# Patient Record
Sex: FEMALE | Race: WHITE | NOT HISPANIC OR LATINO | URBAN - METROPOLITAN AREA
[De-identification: names, ages, dates, MRNs, and addresses within clinical notes are randomized per-mention and may not be internally consistent; named-entity substitution may affect disease eponyms.]

---

## 2019-01-01 ENCOUNTER — INPATIENT (INPATIENT)
Facility: HOSPITAL | Age: 59
LOS: 4 days | DRG: 917 | End: 2020-01-01
Attending: STUDENT IN AN ORGANIZED HEALTH CARE EDUCATION/TRAINING PROGRAM | Admitting: STUDENT IN AN ORGANIZED HEALTH CARE EDUCATION/TRAINING PROGRAM
Payer: COMMERCIAL

## 2019-01-01 VITALS
OXYGEN SATURATION: 100 % | SYSTOLIC BLOOD PRESSURE: 87 MMHG | RESPIRATION RATE: 16 BRPM | DIASTOLIC BLOOD PRESSURE: 51 MMHG | HEART RATE: 64 BPM | HEIGHT: 68 IN | WEIGHT: 130.07 LBS

## 2019-01-01 DIAGNOSIS — G93.1 ANOXIC BRAIN DAMAGE, NOT ELSEWHERE CLASSIFIED: ICD-10-CM

## 2019-01-01 DIAGNOSIS — I46.9 CARDIAC ARREST, CAUSE UNSPECIFIED: ICD-10-CM

## 2019-01-01 DIAGNOSIS — I21.9 ACUTE MYOCARDIAL INFARCTION, UNSPECIFIED: ICD-10-CM

## 2019-01-01 DIAGNOSIS — E87.2 ACIDOSIS: ICD-10-CM

## 2019-01-01 DIAGNOSIS — J96.90 RESPIRATORY FAILURE, UNSPECIFIED, UNSPECIFIED WHETHER WITH HYPOXIA OR HYPERCAPNIA: ICD-10-CM

## 2019-01-01 DIAGNOSIS — F11.20 OPIOID DEPENDENCE, UNCOMPLICATED: ICD-10-CM

## 2019-01-01 LAB
ALBUMIN SERPL ELPH-MCNC: 2.1 G/DL — LOW (ref 3.3–5)
ALBUMIN SERPL ELPH-MCNC: 2.3 G/DL — LOW (ref 3.3–5)
ALBUMIN SERPL ELPH-MCNC: 2.5 G/DL — LOW (ref 3.3–5)
ALBUMIN SERPL ELPH-MCNC: 2.6 G/DL — LOW (ref 3.3–5)
ALBUMIN SERPL ELPH-MCNC: 2.7 G/DL — LOW (ref 3.3–5)
ALP SERPL-CCNC: 131 U/L — HIGH (ref 30–120)
ALP SERPL-CCNC: 133 U/L — HIGH (ref 30–120)
ALP SERPL-CCNC: 153 U/L — HIGH (ref 30–120)
ALP SERPL-CCNC: 155 U/L — HIGH (ref 30–120)
ALP SERPL-CCNC: 159 U/L — HIGH (ref 30–120)
ALP SERPL-CCNC: 159 U/L — HIGH (ref 30–120)
ALP SERPL-CCNC: 172 U/L — HIGH (ref 30–120)
ALP SERPL-CCNC: 175 U/L — HIGH (ref 30–120)
ALP SERPL-CCNC: 201 U/L — HIGH (ref 30–120)
ALT FLD-CCNC: 109 U/L DA — HIGH (ref 10–60)
ALT FLD-CCNC: 161 U/L DA — HIGH (ref 10–60)
ALT FLD-CCNC: 199 U/L DA — HIGH (ref 10–60)
ALT FLD-CCNC: 200 U/L DA — HIGH (ref 10–60)
ALT FLD-CCNC: 218 U/L DA — HIGH (ref 10–60)
ALT FLD-CCNC: 229 U/L DA — HIGH (ref 10–60)
ALT FLD-CCNC: 236 U/L DA — HIGH (ref 10–60)
ALT FLD-CCNC: 245 U/L DA — HIGH (ref 10–60)
ALT FLD-CCNC: 275 U/L DA — HIGH (ref 10–60)
ANION GAP SERPL CALC-SCNC: 10 MMOL/L — SIGNIFICANT CHANGE UP (ref 5–17)
ANION GAP SERPL CALC-SCNC: 13 MMOL/L — SIGNIFICANT CHANGE UP (ref 5–17)
ANION GAP SERPL CALC-SCNC: 13 MMOL/L — SIGNIFICANT CHANGE UP (ref 5–17)
ANION GAP SERPL CALC-SCNC: 22 MMOL/L — HIGH (ref 5–17)
ANION GAP SERPL CALC-SCNC: 6 MMOL/L — SIGNIFICANT CHANGE UP (ref 5–17)
ANION GAP SERPL CALC-SCNC: 7 MMOL/L — SIGNIFICANT CHANGE UP (ref 5–17)
ANION GAP SERPL CALC-SCNC: 8 MMOL/L — SIGNIFICANT CHANGE UP (ref 5–17)
ANION GAP SERPL CALC-SCNC: 8 MMOL/L — SIGNIFICANT CHANGE UP (ref 5–17)
ANION GAP SERPL CALC-SCNC: 9 MMOL/L — SIGNIFICANT CHANGE UP (ref 5–17)
APPEARANCE UR: CLEAR — SIGNIFICANT CHANGE UP
APTT BLD: 34.4 SEC — SIGNIFICANT CHANGE UP (ref 28.5–37)
APTT BLD: 43.1 SEC — HIGH (ref 28.5–37)
APTT BLD: 47.6 SEC — HIGH (ref 28.5–37)
APTT BLD: 48.1 SEC — HIGH (ref 28.5–37)
APTT BLD: 49 SEC — HIGH (ref 28.5–37)
APTT BLD: 49 SEC — HIGH (ref 28.5–37)
APTT BLD: 62.3 SEC — HIGH (ref 28.5–37)
APTT BLD: 65.5 SEC — HIGH (ref 28.5–37)
APTT BLD: 69.8 SEC — HIGH (ref 28.5–37)
APTT BLD: 71.8 SEC — HIGH (ref 28.5–37)
APTT BLD: 82.9 SEC — HIGH (ref 28.5–37)
APTT BLD: 83.4 SEC — HIGH (ref 28.5–37)
APTT BLD: 88.1 SEC — HIGH (ref 28.5–37)
AST SERPL-CCNC: 107 U/L — HIGH (ref 10–40)
AST SERPL-CCNC: 200 U/L — HIGH (ref 10–40)
AST SERPL-CCNC: 313 U/L — HIGH (ref 10–40)
AST SERPL-CCNC: 336 U/L — HIGH (ref 10–40)
AST SERPL-CCNC: 369 U/L — HIGH (ref 10–40)
AST SERPL-CCNC: 424 U/L — HIGH (ref 10–40)
AST SERPL-CCNC: 463 U/L — HIGH (ref 10–40)
AST SERPL-CCNC: 506 U/L — HIGH (ref 10–40)
AST SERPL-CCNC: 783 U/L — HIGH (ref 10–40)
BASE EXCESS BLDA CALC-SCNC: -14 MMOL/L — LOW (ref -2–2)
BASE EXCESS BLDA CALC-SCNC: -2.8 MMOL/L — LOW (ref -2–2)
BASE EXCESS BLDA CALC-SCNC: -3.6 MMOL/L — LOW (ref -2–2)
BASE EXCESS BLDA CALC-SCNC: -4.1 MMOL/L — LOW (ref -2–2)
BASE EXCESS BLDA CALC-SCNC: -5.3 MMOL/L — LOW (ref -2–2)
BASOPHILS # BLD AUTO: 0.01 K/UL — SIGNIFICANT CHANGE UP (ref 0–0.2)
BASOPHILS # BLD AUTO: 0.05 K/UL — SIGNIFICANT CHANGE UP (ref 0–0.2)
BASOPHILS NFR BLD AUTO: 0.1 % — SIGNIFICANT CHANGE UP (ref 0–2)
BASOPHILS NFR BLD AUTO: 0.5 % — SIGNIFICANT CHANGE UP (ref 0–2)
BILIRUB SERPL-MCNC: 0.2 MG/DL — SIGNIFICANT CHANGE UP (ref 0.2–1.2)
BILIRUB SERPL-MCNC: 0.3 MG/DL — SIGNIFICANT CHANGE UP (ref 0.2–1.2)
BILIRUB SERPL-MCNC: 0.4 MG/DL — SIGNIFICANT CHANGE UP (ref 0.2–1.2)
BILIRUB SERPL-MCNC: 0.5 MG/DL — SIGNIFICANT CHANGE UP (ref 0.2–1.2)
BILIRUB SERPL-MCNC: 0.6 MG/DL — SIGNIFICANT CHANGE UP (ref 0.2–1.2)
BILIRUB SERPL-MCNC: 0.8 MG/DL — SIGNIFICANT CHANGE UP (ref 0.2–1.2)
BILIRUB UR-MCNC: NEGATIVE — SIGNIFICANT CHANGE UP
BLOOD GAS SOURCE: SIGNIFICANT CHANGE UP
BUN SERPL-MCNC: 10 MG/DL — SIGNIFICANT CHANGE UP (ref 7–23)
BUN SERPL-MCNC: 12 MG/DL — SIGNIFICANT CHANGE UP (ref 7–23)
BUN SERPL-MCNC: 17 MG/DL — SIGNIFICANT CHANGE UP (ref 7–23)
BUN SERPL-MCNC: 18 MG/DL — SIGNIFICANT CHANGE UP (ref 7–23)
BUN SERPL-MCNC: 18 MG/DL — SIGNIFICANT CHANGE UP (ref 7–23)
BUN SERPL-MCNC: 19 MG/DL — SIGNIFICANT CHANGE UP (ref 7–23)
BUN SERPL-MCNC: 19 MG/DL — SIGNIFICANT CHANGE UP (ref 7–23)
BUN SERPL-MCNC: 20 MG/DL — SIGNIFICANT CHANGE UP (ref 7–23)
BUN SERPL-MCNC: 20 MG/DL — SIGNIFICANT CHANGE UP (ref 7–23)
CALCIUM SERPL-MCNC: 7.4 MG/DL — LOW (ref 8.4–10.5)
CALCIUM SERPL-MCNC: 7.7 MG/DL — LOW (ref 8.4–10.5)
CALCIUM SERPL-MCNC: 7.8 MG/DL — LOW (ref 8.4–10.5)
CALCIUM SERPL-MCNC: 7.9 MG/DL — LOW (ref 8.4–10.5)
CALCIUM SERPL-MCNC: 8.1 MG/DL — LOW (ref 8.4–10.5)
CALCIUM SERPL-MCNC: 8.4 MG/DL — SIGNIFICANT CHANGE UP (ref 8.4–10.5)
CALCIUM SERPL-MCNC: 9.2 MG/DL — SIGNIFICANT CHANGE UP (ref 8.4–10.5)
CHLORIDE SERPL-SCNC: 105 MMOL/L — SIGNIFICANT CHANGE UP (ref 96–108)
CHLORIDE SERPL-SCNC: 106 MMOL/L — SIGNIFICANT CHANGE UP (ref 96–108)
CHLORIDE SERPL-SCNC: 108 MMOL/L — SIGNIFICANT CHANGE UP (ref 96–108)
CHLORIDE SERPL-SCNC: 110 MMOL/L — HIGH (ref 96–108)
CHLORIDE SERPL-SCNC: 111 MMOL/L — HIGH (ref 96–108)
CHLORIDE SERPL-SCNC: 130 MMOL/L — HIGH (ref 96–108)
CO2 SERPL-SCNC: 17 MMOL/L — LOW (ref 22–31)
CO2 SERPL-SCNC: 20 MMOL/L — LOW (ref 22–31)
CO2 SERPL-SCNC: 21 MMOL/L — LOW (ref 22–31)
CO2 SERPL-SCNC: 22 MMOL/L — SIGNIFICANT CHANGE UP (ref 22–31)
CO2 SERPL-SCNC: 23 MMOL/L — SIGNIFICANT CHANGE UP (ref 22–31)
CO2 SERPL-SCNC: 24 MMOL/L — SIGNIFICANT CHANGE UP (ref 22–31)
CO2 SERPL-SCNC: 25 MMOL/L — SIGNIFICANT CHANGE UP (ref 22–31)
CO2 SERPL-SCNC: 25 MMOL/L — SIGNIFICANT CHANGE UP (ref 22–31)
CO2 SERPL-SCNC: 28 MMOL/L — SIGNIFICANT CHANGE UP (ref 22–31)
COLOR SPEC: YELLOW — SIGNIFICANT CHANGE UP
CREAT SERPL-MCNC: 0.93 MG/DL — SIGNIFICANT CHANGE UP (ref 0.5–1.3)
CREAT SERPL-MCNC: 1.06 MG/DL — SIGNIFICANT CHANGE UP (ref 0.5–1.3)
CREAT SERPL-MCNC: 1.07 MG/DL — SIGNIFICANT CHANGE UP (ref 0.5–1.3)
CREAT SERPL-MCNC: 1.09 MG/DL — SIGNIFICANT CHANGE UP (ref 0.5–1.3)
CREAT SERPL-MCNC: 1.11 MG/DL — SIGNIFICANT CHANGE UP (ref 0.5–1.3)
CREAT SERPL-MCNC: 1.13 MG/DL — SIGNIFICANT CHANGE UP (ref 0.5–1.3)
CREAT SERPL-MCNC: 1.15 MG/DL — SIGNIFICANT CHANGE UP (ref 0.5–1.3)
CREAT SERPL-MCNC: 1.15 MG/DL — SIGNIFICANT CHANGE UP (ref 0.5–1.3)
CREAT SERPL-MCNC: 1.42 MG/DL — HIGH (ref 0.5–1.3)
CULTURE RESULTS: NO GROWTH — SIGNIFICANT CHANGE UP
DIFF PNL FLD: ABNORMAL
EOSINOPHIL # BLD AUTO: 0 K/UL — SIGNIFICANT CHANGE UP (ref 0–0.5)
EOSINOPHIL # BLD AUTO: 0.14 K/UL — SIGNIFICANT CHANGE UP (ref 0–0.5)
EOSINOPHIL NFR BLD AUTO: 0 % — SIGNIFICANT CHANGE UP (ref 0–6)
EOSINOPHIL NFR BLD AUTO: 1.3 % — SIGNIFICANT CHANGE UP (ref 0–6)
GLUCOSE SERPL-MCNC: 108 MG/DL — HIGH (ref 70–99)
GLUCOSE SERPL-MCNC: 116 MG/DL — HIGH (ref 70–99)
GLUCOSE SERPL-MCNC: 130 MG/DL — HIGH (ref 70–99)
GLUCOSE SERPL-MCNC: 131 MG/DL — HIGH (ref 70–99)
GLUCOSE SERPL-MCNC: 149 MG/DL — HIGH (ref 70–99)
GLUCOSE SERPL-MCNC: 153 MG/DL — HIGH (ref 70–99)
GLUCOSE SERPL-MCNC: 172 MG/DL — HIGH (ref 70–99)
GLUCOSE SERPL-MCNC: 200 MG/DL — HIGH (ref 70–99)
GLUCOSE SERPL-MCNC: 210 MG/DL — HIGH (ref 70–99)
GLUCOSE UR QL: NEGATIVE MG/DL — SIGNIFICANT CHANGE UP
HBA1C BLD-MCNC: 5.3 % — SIGNIFICANT CHANGE UP (ref 4–5.6)
HCO3 BLDA-SCNC: 13 MMOL/L — LOW (ref 21–29)
HCO3 BLDA-SCNC: 20 MMOL/L — LOW (ref 21–29)
HCO3 BLDA-SCNC: 21 MMOL/L — SIGNIFICANT CHANGE UP (ref 21–29)
HCO3 BLDA-SCNC: 22 MMOL/L — SIGNIFICANT CHANGE UP (ref 21–29)
HCT VFR BLD CALC: 32.7 % — LOW (ref 34.5–45)
HCT VFR BLD CALC: 35.7 % — SIGNIFICANT CHANGE UP (ref 34.5–45)
HCT VFR BLD CALC: 35.7 % — SIGNIFICANT CHANGE UP (ref 34.5–45)
HCT VFR BLD CALC: 36.1 % — SIGNIFICANT CHANGE UP (ref 34.5–45)
HCT VFR BLD CALC: 36.3 % — SIGNIFICANT CHANGE UP (ref 34.5–45)
HCT VFR BLD CALC: 36.3 % — SIGNIFICANT CHANGE UP (ref 34.5–45)
HCT VFR BLD CALC: 36.4 % — SIGNIFICANT CHANGE UP (ref 34.5–45)
HCT VFR BLD CALC: 38.6 % — SIGNIFICANT CHANGE UP (ref 34.5–45)
HCV AB S/CO SERPL IA: 0.07 S/CO — SIGNIFICANT CHANGE UP (ref 0–0.99)
HCV AB SERPL-IMP: SIGNIFICANT CHANGE UP
HGB BLD-MCNC: 10.7 G/DL — LOW (ref 11.5–15.5)
HGB BLD-MCNC: 11.2 G/DL — LOW (ref 11.5–15.5)
HGB BLD-MCNC: 11.7 G/DL — SIGNIFICANT CHANGE UP (ref 11.5–15.5)
HGB BLD-MCNC: 11.8 G/DL — SIGNIFICANT CHANGE UP (ref 11.5–15.5)
HGB BLD-MCNC: 11.8 G/DL — SIGNIFICANT CHANGE UP (ref 11.5–15.5)
HGB BLD-MCNC: 12 G/DL — SIGNIFICANT CHANGE UP (ref 11.5–15.5)
HGB BLD-MCNC: 12.2 G/DL — SIGNIFICANT CHANGE UP (ref 11.5–15.5)
HGB BLD-MCNC: 12.2 G/DL — SIGNIFICANT CHANGE UP (ref 11.5–15.5)
HOROWITZ INDEX BLDA+IHG-RTO: 100 — SIGNIFICANT CHANGE UP
HOROWITZ INDEX BLDA+IHG-RTO: 21 — SIGNIFICANT CHANGE UP
HOROWITZ INDEX BLDA+IHG-RTO: 21 — SIGNIFICANT CHANGE UP
HOROWITZ INDEX BLDA+IHG-RTO: 25 — SIGNIFICANT CHANGE UP
HOROWITZ INDEX BLDA+IHG-RTO: 30 — SIGNIFICANT CHANGE UP
HOROWITZ INDEX BLDA+IHG-RTO: 40 — SIGNIFICANT CHANGE UP
IMM GRANULOCYTES NFR BLD AUTO: 0.6 % — SIGNIFICANT CHANGE UP (ref 0–1.5)
IMM GRANULOCYTES NFR BLD AUTO: 3.3 % — HIGH (ref 0–1.5)
INR BLD: 1.19 RATIO — HIGH (ref 0.88–1.16)
INR BLD: 1.2 RATIO — HIGH (ref 0.88–1.16)
KETONES UR-MCNC: NEGATIVE — SIGNIFICANT CHANGE UP
LACTATE SERPL-SCNC: 1.3 MMOL/L — SIGNIFICANT CHANGE UP (ref 0.7–2)
LACTATE SERPL-SCNC: 1.4 MMOL/L — SIGNIFICANT CHANGE UP (ref 0.7–2)
LACTATE SERPL-SCNC: 1.5 MMOL/L — SIGNIFICANT CHANGE UP (ref 0.7–2)
LACTATE SERPL-SCNC: 13.6 MMOL/L — CRITICAL HIGH (ref 0.7–2)
LACTATE SERPL-SCNC: 2.5 MMOL/L — HIGH (ref 0.7–2)
LACTATE SERPL-SCNC: 3.9 MMOL/L — HIGH (ref 0.7–2)
LEUKOCYTE ESTERASE UR-ACNC: ABNORMAL
LIDOCAIN IGE QN: 293 U/L — SIGNIFICANT CHANGE UP (ref 73–393)
LYMPHOCYTES # BLD AUTO: 0.54 K/UL — LOW (ref 1–3.3)
LYMPHOCYTES # BLD AUTO: 3.77 K/UL — HIGH (ref 1–3.3)
LYMPHOCYTES # BLD AUTO: 35.6 % — SIGNIFICANT CHANGE UP (ref 13–44)
LYMPHOCYTES # BLD AUTO: 4.2 % — LOW (ref 13–44)
MAGNESIUM SERPL-MCNC: 1.6 MG/DL — SIGNIFICANT CHANGE UP (ref 1.6–2.6)
MAGNESIUM SERPL-MCNC: 1.8 MG/DL — SIGNIFICANT CHANGE UP (ref 1.6–2.6)
MAGNESIUM SERPL-MCNC: 1.9 MG/DL — SIGNIFICANT CHANGE UP (ref 1.6–2.6)
MAGNESIUM SERPL-MCNC: 2.2 MG/DL — SIGNIFICANT CHANGE UP (ref 1.6–2.6)
MAGNESIUM SERPL-MCNC: 2.3 MG/DL — SIGNIFICANT CHANGE UP (ref 1.6–2.6)
MAGNESIUM SERPL-MCNC: 2.3 MG/DL — SIGNIFICANT CHANGE UP (ref 1.6–2.6)
MAGNESIUM SERPL-MCNC: 2.4 MG/DL — SIGNIFICANT CHANGE UP (ref 1.6–2.6)
MAGNESIUM SERPL-MCNC: 2.5 MG/DL — SIGNIFICANT CHANGE UP (ref 1.6–2.6)
MAGNESIUM SERPL-MCNC: 2.5 MG/DL — SIGNIFICANT CHANGE UP (ref 1.6–2.6)
MAGNESIUM SERPL-MCNC: 2.7 MG/DL — HIGH (ref 1.6–2.6)
MCHC RBC-ENTMCNC: 31 GM/DL — LOW (ref 32–36)
MCHC RBC-ENTMCNC: 31 PG — SIGNIFICANT CHANGE UP (ref 27–34)
MCHC RBC-ENTMCNC: 31.1 PG — SIGNIFICANT CHANGE UP (ref 27–34)
MCHC RBC-ENTMCNC: 31.2 PG — SIGNIFICANT CHANGE UP (ref 27–34)
MCHC RBC-ENTMCNC: 31.4 PG — SIGNIFICANT CHANGE UP (ref 27–34)
MCHC RBC-ENTMCNC: 31.4 PG — SIGNIFICANT CHANGE UP (ref 27–34)
MCHC RBC-ENTMCNC: 31.5 PG — SIGNIFICANT CHANGE UP (ref 27–34)
MCHC RBC-ENTMCNC: 31.6 GM/DL — LOW (ref 32–36)
MCHC RBC-ENTMCNC: 31.9 PG — SIGNIFICANT CHANGE UP (ref 27–34)
MCHC RBC-ENTMCNC: 31.9 PG — SIGNIFICANT CHANGE UP (ref 27–34)
MCHC RBC-ENTMCNC: 32.5 GM/DL — SIGNIFICANT CHANGE UP (ref 32–36)
MCHC RBC-ENTMCNC: 32.5 GM/DL — SIGNIFICANT CHANGE UP (ref 32–36)
MCHC RBC-ENTMCNC: 32.7 GM/DL — SIGNIFICANT CHANGE UP (ref 32–36)
MCHC RBC-ENTMCNC: 32.8 GM/DL — SIGNIFICANT CHANGE UP (ref 32–36)
MCHC RBC-ENTMCNC: 33.5 GM/DL — SIGNIFICANT CHANGE UP (ref 32–36)
MCHC RBC-ENTMCNC: 33.6 GM/DL — SIGNIFICANT CHANGE UP (ref 32–36)
MCV RBC AUTO: 101.4 FL — HIGH (ref 80–100)
MCV RBC AUTO: 94.8 FL — SIGNIFICANT CHANGE UP (ref 80–100)
MCV RBC AUTO: 94.9 FL — SIGNIFICANT CHANGE UP (ref 80–100)
MCV RBC AUTO: 94.9 FL — SIGNIFICANT CHANGE UP (ref 80–100)
MCV RBC AUTO: 95.3 FL — SIGNIFICANT CHANGE UP (ref 80–100)
MCV RBC AUTO: 96 FL — SIGNIFICANT CHANGE UP (ref 80–100)
MCV RBC AUTO: 96.5 FL — SIGNIFICANT CHANGE UP (ref 80–100)
MCV RBC AUTO: 99.2 FL — SIGNIFICANT CHANGE UP (ref 80–100)
MONOCYTES # BLD AUTO: 0.42 K/UL — SIGNIFICANT CHANGE UP (ref 0–0.9)
MONOCYTES # BLD AUTO: 0.56 K/UL — SIGNIFICANT CHANGE UP (ref 0–0.9)
MONOCYTES NFR BLD AUTO: 4 % — SIGNIFICANT CHANGE UP (ref 2–14)
MONOCYTES NFR BLD AUTO: 4.4 % — SIGNIFICANT CHANGE UP (ref 2–14)
NEUTROPHILS # BLD AUTO: 11.53 K/UL — HIGH (ref 1.8–7.4)
NEUTROPHILS # BLD AUTO: 5.85 K/UL — SIGNIFICANT CHANGE UP (ref 1.8–7.4)
NEUTROPHILS NFR BLD AUTO: 55.3 % — SIGNIFICANT CHANGE UP (ref 43–77)
NEUTROPHILS NFR BLD AUTO: 90.7 % — HIGH (ref 43–77)
NITRITE UR-MCNC: NEGATIVE — SIGNIFICANT CHANGE UP
NRBC # BLD: 0 /100 WBCS — SIGNIFICANT CHANGE UP (ref 0–0)
NT-PROBNP SERPL-SCNC: 3230 PG/ML — HIGH (ref 0–125)
PCO2 BLDA: 32 MMHG — SIGNIFICANT CHANGE UP (ref 32–46)
PCO2 BLDA: 40 MMHG — SIGNIFICANT CHANGE UP (ref 32–46)
PCO2 BLDA: 41 MMHG — SIGNIFICANT CHANGE UP (ref 32–46)
PCO2 BLDA: 43 MMHG — SIGNIFICANT CHANGE UP (ref 32–46)
PCO2 BLDA: 44 MMHG — SIGNIFICANT CHANGE UP (ref 32–46)
PCO2 BLDA: 48 MMHG — HIGH (ref 32–46)
PCP SPEC-MCNC: SIGNIFICANT CHANGE UP
PH BLD: 7.09 — CRITICAL LOW (ref 7.35–7.45)
PH BLD: 7.29 — LOW (ref 7.35–7.45)
PH BLD: 7.3 — LOW (ref 7.35–7.45)
PH BLD: 7.34 — LOW (ref 7.35–7.45)
PH BLD: 7.35 — SIGNIFICANT CHANGE UP (ref 7.35–7.45)
PH BLD: 7.4 — SIGNIFICANT CHANGE UP (ref 7.35–7.45)
PH UR: 5 — SIGNIFICANT CHANGE UP (ref 5–8)
PHOSPHATE SERPL-MCNC: 2.2 MG/DL — LOW (ref 2.5–4.5)
PHOSPHATE SERPL-MCNC: 3.2 MG/DL — SIGNIFICANT CHANGE UP (ref 2.5–4.5)
PHOSPHATE SERPL-MCNC: 3.9 MG/DL — SIGNIFICANT CHANGE UP (ref 2.5–4.5)
PHOSPHATE SERPL-MCNC: 3.9 MG/DL — SIGNIFICANT CHANGE UP (ref 2.5–4.5)
PHOSPHATE SERPL-MCNC: 4.1 MG/DL — SIGNIFICANT CHANGE UP (ref 2.5–4.5)
PHOSPHATE SERPL-MCNC: 4.2 MG/DL — SIGNIFICANT CHANGE UP (ref 2.5–4.5)
PHOSPHATE SERPL-MCNC: 4.5 MG/DL — SIGNIFICANT CHANGE UP (ref 2.5–4.5)
PHOSPHATE SERPL-MCNC: 4.6 MG/DL — HIGH (ref 2.5–4.5)
PLATELET # BLD AUTO: 261 K/UL — SIGNIFICANT CHANGE UP (ref 150–400)
PLATELET # BLD AUTO: 266 K/UL — SIGNIFICANT CHANGE UP (ref 150–400)
PLATELET # BLD AUTO: 271 K/UL — SIGNIFICANT CHANGE UP (ref 150–400)
PLATELET # BLD AUTO: 277 K/UL — SIGNIFICANT CHANGE UP (ref 150–400)
PLATELET # BLD AUTO: 282 K/UL — SIGNIFICANT CHANGE UP (ref 150–400)
PLATELET # BLD AUTO: 322 K/UL — SIGNIFICANT CHANGE UP (ref 150–400)
PLATELET # BLD AUTO: 344 K/UL — SIGNIFICANT CHANGE UP (ref 150–400)
PLATELET # BLD AUTO: 352 K/UL — SIGNIFICANT CHANGE UP (ref 150–400)
PO2 BLDA: 101 MMHG — SIGNIFICANT CHANGE UP (ref 74–108)
PO2 BLDA: 114 MMHG — HIGH (ref 74–108)
PO2 BLDA: 138 MMHG — HIGH (ref 74–108)
PO2 BLDA: 189 MMHG — HIGH (ref 74–108)
PO2 BLDA: 57 MMHG — LOW (ref 74–108)
PO2 BLDA: 81 MMHG — SIGNIFICANT CHANGE UP (ref 74–108)
POTASSIUM SERPL-MCNC: 2.7 MMOL/L — CRITICAL LOW (ref 3.5–5.3)
POTASSIUM SERPL-MCNC: 2.7 MMOL/L — CRITICAL LOW (ref 3.5–5.3)
POTASSIUM SERPL-MCNC: 3 MMOL/L — LOW (ref 3.5–5.3)
POTASSIUM SERPL-MCNC: 3.7 MMOL/L — SIGNIFICANT CHANGE UP (ref 3.5–5.3)
POTASSIUM SERPL-MCNC: 3.8 MMOL/L — SIGNIFICANT CHANGE UP (ref 3.5–5.3)
POTASSIUM SERPL-MCNC: 4.4 MMOL/L — SIGNIFICANT CHANGE UP (ref 3.5–5.3)
POTASSIUM SERPL-MCNC: 4.5 MMOL/L — SIGNIFICANT CHANGE UP (ref 3.5–5.3)
POTASSIUM SERPL-MCNC: 4.5 MMOL/L — SIGNIFICANT CHANGE UP (ref 3.5–5.3)
POTASSIUM SERPL-MCNC: 4.6 MMOL/L — SIGNIFICANT CHANGE UP (ref 3.5–5.3)
POTASSIUM SERPL-SCNC: 2.7 MMOL/L — CRITICAL LOW (ref 3.5–5.3)
POTASSIUM SERPL-SCNC: 2.7 MMOL/L — CRITICAL LOW (ref 3.5–5.3)
POTASSIUM SERPL-SCNC: 3 MMOL/L — LOW (ref 3.5–5.3)
POTASSIUM SERPL-SCNC: 3.7 MMOL/L — SIGNIFICANT CHANGE UP (ref 3.5–5.3)
POTASSIUM SERPL-SCNC: 3.8 MMOL/L — SIGNIFICANT CHANGE UP (ref 3.5–5.3)
POTASSIUM SERPL-SCNC: 4.4 MMOL/L — SIGNIFICANT CHANGE UP (ref 3.5–5.3)
POTASSIUM SERPL-SCNC: 4.5 MMOL/L — SIGNIFICANT CHANGE UP (ref 3.5–5.3)
POTASSIUM SERPL-SCNC: 4.5 MMOL/L — SIGNIFICANT CHANGE UP (ref 3.5–5.3)
POTASSIUM SERPL-SCNC: 4.6 MMOL/L — SIGNIFICANT CHANGE UP (ref 3.5–5.3)
PROCALCITONIN SERPL-MCNC: 0.06 NG/ML — SIGNIFICANT CHANGE UP (ref 0.02–0.1)
PROT SERPL-MCNC: 5.1 G/DL — LOW (ref 6–8.3)
PROT SERPL-MCNC: 5.6 G/DL — LOW (ref 6–8.3)
PROT SERPL-MCNC: 5.6 G/DL — LOW (ref 6–8.3)
PROT SERPL-MCNC: 5.7 G/DL — LOW (ref 6–8.3)
PROT SERPL-MCNC: 5.7 G/DL — LOW (ref 6–8.3)
PROT SERPL-MCNC: 5.9 G/DL — LOW (ref 6–8.3)
PROT SERPL-MCNC: 5.9 G/DL — LOW (ref 6–8.3)
PROT SERPL-MCNC: 6 G/DL — SIGNIFICANT CHANGE UP (ref 6–8.3)
PROT SERPL-MCNC: 6 G/DL — SIGNIFICANT CHANGE UP (ref 6–8.3)
PROT UR-MCNC: NEGATIVE MG/DL — SIGNIFICANT CHANGE UP
PROTHROM AB SERPL-ACNC: 13 SEC — HIGH (ref 10–12.9)
PROTHROM AB SERPL-ACNC: 13.1 SEC — HIGH (ref 10–12.9)
PROTHROM AB SERPL-ACNC: 13.1 SEC — HIGH (ref 10–12.9)
PROTHROM AB SERPL-ACNC: 13.2 SEC — HIGH (ref 10–12.9)
PROTHROM AB SERPL-ACNC: 13.2 SEC — HIGH (ref 10–12.9)
RBC # BLD: 3.45 M/UL — LOW (ref 3.8–5.2)
RBC # BLD: 3.56 M/UL — LOW (ref 3.8–5.2)
RBC # BLD: 3.76 M/UL — LOW (ref 3.8–5.2)
RBC # BLD: 3.78 M/UL — LOW (ref 3.8–5.2)
RBC # BLD: 3.82 M/UL — SIGNIFICANT CHANGE UP (ref 3.8–5.2)
RBC # BLD: 3.89 M/UL — SIGNIFICANT CHANGE UP (ref 3.8–5.2)
RBC # FLD: 12.3 % — SIGNIFICANT CHANGE UP (ref 10.3–14.5)
RBC # FLD: 12.5 % — SIGNIFICANT CHANGE UP (ref 10.3–14.5)
RBC # FLD: 12.6 % — SIGNIFICANT CHANGE UP (ref 10.3–14.5)
RBC # FLD: 12.8 % — SIGNIFICANT CHANGE UP (ref 10.3–14.5)
RBC # FLD: 12.9 % — SIGNIFICANT CHANGE UP (ref 10.3–14.5)
RBC # FLD: 13.1 % — SIGNIFICANT CHANGE UP (ref 10.3–14.5)
RBC # FLD: 13.4 % — SIGNIFICANT CHANGE UP (ref 10.3–14.5)
RBC # FLD: 14.1 % — SIGNIFICANT CHANGE UP (ref 10.3–14.5)
SAO2 % BLDA: 88 % — LOW (ref 92–96)
SAO2 % BLDA: 94 % — SIGNIFICANT CHANGE UP (ref 92–96)
SAO2 % BLDA: 95 % — SIGNIFICANT CHANGE UP (ref 92–96)
SAO2 % BLDA: 98 % — HIGH (ref 92–96)
SAO2 % BLDA: 99 % — HIGH (ref 92–96)
SAO2 % BLDA: 99 % — HIGH (ref 92–96)
SODIUM SERPL-SCNC: 139 MMOL/L — SIGNIFICANT CHANGE UP (ref 135–145)
SODIUM SERPL-SCNC: 139 MMOL/L — SIGNIFICANT CHANGE UP (ref 135–145)
SODIUM SERPL-SCNC: 141 MMOL/L — SIGNIFICANT CHANGE UP (ref 135–145)
SODIUM SERPL-SCNC: 141 MMOL/L — SIGNIFICANT CHANGE UP (ref 135–145)
SODIUM SERPL-SCNC: 142 MMOL/L — SIGNIFICANT CHANGE UP (ref 135–145)
SODIUM SERPL-SCNC: 144 MMOL/L — SIGNIFICANT CHANGE UP (ref 135–145)
SODIUM SERPL-SCNC: 165 MMOL/L — CRITICAL HIGH (ref 135–145)
SP GR SPEC: 1.01 — SIGNIFICANT CHANGE UP (ref 1.01–1.02)
SPECIMEN SOURCE: SIGNIFICANT CHANGE UP
T3 SERPL-MCNC: 110 NG/DL — SIGNIFICANT CHANGE UP (ref 80–200)
T4 AB SER-ACNC: 6.4 UG/DL — SIGNIFICANT CHANGE UP (ref 4.6–12)
TROPONIN I SERPL-MCNC: 0.39 NG/ML — HIGH (ref 0.02–0.06)
TROPONIN I SERPL-MCNC: 11.05 NG/ML — HIGH (ref 0.02–0.06)
TROPONIN I SERPL-MCNC: 16.13 NG/ML — HIGH (ref 0.02–0.06)
TROPONIN I SERPL-MCNC: 19.28 NG/ML — HIGH (ref 0.02–0.06)
TROPONIN I SERPL-MCNC: 28.92 NG/ML — HIGH (ref 0.02–0.06)
TROPONIN I SERPL-MCNC: 4.29 NG/ML — HIGH (ref 0.02–0.06)
TSH SERPL-MCNC: 1.58 UIU/ML — SIGNIFICANT CHANGE UP (ref 0.27–4.2)
UROBILINOGEN FLD QL: NEGATIVE MG/DL — SIGNIFICANT CHANGE UP
WBC # BLD: 10.58 K/UL — HIGH (ref 3.8–10.5)
WBC # BLD: 11.35 K/UL — HIGH (ref 3.8–10.5)
WBC # BLD: 12.72 K/UL — HIGH (ref 3.8–10.5)
WBC # BLD: 13.31 K/UL — HIGH (ref 3.8–10.5)
WBC # BLD: 13.67 K/UL — HIGH (ref 3.8–10.5)
WBC # BLD: 14.74 K/UL — HIGH (ref 3.8–10.5)
WBC # BLD: 15.51 K/UL — HIGH (ref 3.8–10.5)
WBC # BLD: 22.42 K/UL — HIGH (ref 3.8–10.5)
WBC # FLD AUTO: 10.58 K/UL — HIGH (ref 3.8–10.5)
WBC # FLD AUTO: 11.35 K/UL — HIGH (ref 3.8–10.5)
WBC # FLD AUTO: 12.72 K/UL — HIGH (ref 3.8–10.5)
WBC # FLD AUTO: 13.31 K/UL — HIGH (ref 3.8–10.5)
WBC # FLD AUTO: 13.67 K/UL — HIGH (ref 3.8–10.5)
WBC # FLD AUTO: 14.74 K/UL — HIGH (ref 3.8–10.5)
WBC # FLD AUTO: 15.51 K/UL — HIGH (ref 3.8–10.5)
WBC # FLD AUTO: 22.42 K/UL — HIGH (ref 3.8–10.5)

## 2019-01-01 PROCEDURE — 93010 ELECTROCARDIOGRAM REPORT: CPT

## 2019-01-01 PROCEDURE — 99223 1ST HOSP IP/OBS HIGH 75: CPT

## 2019-01-01 PROCEDURE — 71250 CT THORAX DX C-: CPT | Mod: 26

## 2019-01-01 PROCEDURE — 71045 X-RAY EXAM CHEST 1 VIEW: CPT | Mod: 26

## 2019-01-01 PROCEDURE — 93970 EXTREMITY STUDY: CPT | Mod: 26

## 2019-01-01 PROCEDURE — 72125 CT NECK SPINE W/O DYE: CPT | Mod: 26

## 2019-01-01 PROCEDURE — 93306 TTE W/DOPPLER COMPLETE: CPT | Mod: 26

## 2019-01-01 PROCEDURE — 74176 CT ABD & PELVIS W/O CONTRAST: CPT | Mod: 26

## 2019-01-01 PROCEDURE — 70450 CT HEAD/BRAIN W/O DYE: CPT | Mod: 26

## 2019-01-01 PROCEDURE — 99232 SBSQ HOSP IP/OBS MODERATE 35: CPT

## 2019-01-01 PROCEDURE — 99233 SBSQ HOSP IP/OBS HIGH 50: CPT

## 2019-01-01 PROCEDURE — 99285 EMERGENCY DEPT VISIT HI MDM: CPT

## 2019-01-01 RX ORDER — DEXTROSE 50 % IN WATER 50 %
25 SYRINGE (ML) INTRAVENOUS ONCE
Refills: 0 | Status: DISCONTINUED | OUTPATIENT
Start: 2019-01-01 | End: 2019-01-01

## 2019-01-01 RX ORDER — NOREPINEPHRINE BITARTRATE/D5W 8 MG/250ML
0.03 PLASTIC BAG, INJECTION (ML) INTRAVENOUS
Qty: 8 | Refills: 0 | Status: DISCONTINUED | OUTPATIENT
Start: 2019-01-01 | End: 2020-01-01

## 2019-01-01 RX ORDER — SODIUM CHLORIDE 9 MG/ML
1000 INJECTION, SOLUTION INTRAVENOUS
Refills: 0 | Status: DISCONTINUED | OUTPATIENT
Start: 2019-01-01 | End: 2019-01-01

## 2019-01-01 RX ORDER — DEXTROSE 50 % IN WATER 50 %
15 SYRINGE (ML) INTRAVENOUS ONCE
Refills: 0 | Status: DISCONTINUED | OUTPATIENT
Start: 2019-01-01 | End: 2019-01-01

## 2019-01-01 RX ORDER — NICARDIPINE HYDROCHLORIDE 30 MG/1
5 CAPSULE, EXTENDED RELEASE ORAL
Qty: 40 | Refills: 0 | Status: DISCONTINUED | OUTPATIENT
Start: 2019-01-01 | End: 2019-01-01

## 2019-01-01 RX ORDER — SODIUM CHLORIDE 9 MG/ML
1000 INJECTION, SOLUTION INTRAVENOUS
Refills: 0 | Status: DISCONTINUED | OUTPATIENT
Start: 2019-01-01 | End: 2020-01-01

## 2019-01-01 RX ORDER — INSULIN LISPRO 100/ML
VIAL (ML) SUBCUTANEOUS
Refills: 0 | Status: DISCONTINUED | OUTPATIENT
Start: 2019-01-01 | End: 2019-01-01

## 2019-01-01 RX ORDER — POTASSIUM CHLORIDE 20 MEQ
40 PACKET (EA) ORAL EVERY 4 HOURS
Refills: 0 | Status: COMPLETED | OUTPATIENT
Start: 2019-01-01 | End: 2019-01-01

## 2019-01-01 RX ORDER — INSULIN LISPRO 100/ML
VIAL (ML) SUBCUTANEOUS EVERY 6 HOURS
Refills: 0 | Status: DISCONTINUED | OUTPATIENT
Start: 2019-01-01 | End: 2019-01-01

## 2019-01-01 RX ORDER — POTASSIUM CHLORIDE 20 MEQ
10 PACKET (EA) ORAL
Refills: 0 | Status: COMPLETED | OUTPATIENT
Start: 2019-01-01 | End: 2019-01-01

## 2019-01-01 RX ORDER — NOREPINEPHRINE BITARTRATE/D5W 8 MG/250ML
0.05 PLASTIC BAG, INJECTION (ML) INTRAVENOUS
Qty: 8 | Refills: 0 | Status: DISCONTINUED | OUTPATIENT
Start: 2019-01-01 | End: 2019-01-01

## 2019-01-01 RX ORDER — MAGNESIUM SULFATE 500 MG/ML
1 VIAL (ML) INJECTION ONCE
Refills: 0 | Status: COMPLETED | OUTPATIENT
Start: 2019-01-01 | End: 2019-01-01

## 2019-01-01 RX ORDER — HEPARIN SODIUM 5000 [USP'U]/ML
5000 INJECTION INTRAVENOUS; SUBCUTANEOUS
Refills: 0 | Status: DISCONTINUED | OUTPATIENT
Start: 2019-01-01 | End: 2019-01-01

## 2019-01-01 RX ORDER — VALPROIC ACID (AS SODIUM SALT) 250 MG/5ML
1000 SOLUTION, ORAL ORAL ONCE
Refills: 0 | Status: COMPLETED | OUTPATIENT
Start: 2019-01-01 | End: 2019-01-01

## 2019-01-01 RX ORDER — DEXTROSE MONOHYDRATE, SODIUM CHLORIDE, AND POTASSIUM CHLORIDE 50; .745; 4.5 G/1000ML; G/1000ML; G/1000ML
1000 INJECTION, SOLUTION INTRAVENOUS
Refills: 0 | Status: DISCONTINUED | OUTPATIENT
Start: 2019-01-01 | End: 2019-01-01

## 2019-01-01 RX ORDER — VALPROIC ACID (AS SODIUM SALT) 250 MG/5ML
500 SOLUTION, ORAL ORAL
Refills: 0 | Status: DISCONTINUED | OUTPATIENT
Start: 2019-01-01 | End: 2020-01-01

## 2019-01-01 RX ORDER — CALCIUM GLUCONATE 100 MG/ML
1 VIAL (ML) INTRAVENOUS ONCE
Refills: 0 | Status: COMPLETED | OUTPATIENT
Start: 2019-01-01 | End: 2019-01-01

## 2019-01-01 RX ORDER — POTASSIUM PHOSPHATE, MONOBASIC POTASSIUM PHOSPHATE, DIBASIC 236; 224 MG/ML; MG/ML
30 INJECTION, SOLUTION INTRAVENOUS ONCE
Refills: 0 | Status: COMPLETED | OUTPATIENT
Start: 2019-01-01 | End: 2019-01-01

## 2019-01-01 RX ORDER — SODIUM CHLORIDE 9 MG/ML
3 INJECTION INTRAMUSCULAR; INTRAVENOUS; SUBCUTANEOUS ONCE
Refills: 0 | Status: COMPLETED | OUTPATIENT
Start: 2019-01-01 | End: 2019-01-01

## 2019-01-01 RX ORDER — HEPARIN SODIUM 5000 [USP'U]/ML
INJECTION INTRAVENOUS; SUBCUTANEOUS
Qty: 25000 | Refills: 0 | Status: DISCONTINUED | OUTPATIENT
Start: 2019-01-01 | End: 2019-01-01

## 2019-01-01 RX ORDER — HYDRALAZINE HCL 50 MG
5 TABLET ORAL ONCE
Refills: 0 | Status: COMPLETED | OUTPATIENT
Start: 2019-01-01 | End: 2019-01-01

## 2019-01-01 RX ORDER — DEXTROSE 50 % IN WATER 50 %
12.5 SYRINGE (ML) INTRAVENOUS ONCE
Refills: 0 | Status: DISCONTINUED | OUTPATIENT
Start: 2019-01-01 | End: 2019-01-01

## 2019-01-01 RX ORDER — PROPOFOL 10 MG/ML
5 INJECTION, EMULSION INTRAVENOUS
Qty: 1000 | Refills: 0 | Status: DISCONTINUED | OUTPATIENT
Start: 2019-01-01 | End: 2019-01-01

## 2019-01-01 RX ORDER — PIPERACILLIN AND TAZOBACTAM 4; .5 G/20ML; G/20ML
3.38 INJECTION, POWDER, LYOPHILIZED, FOR SOLUTION INTRAVENOUS ONCE
Refills: 0 | Status: COMPLETED | OUTPATIENT
Start: 2019-01-01 | End: 2019-01-01

## 2019-01-01 RX ORDER — PANTOPRAZOLE SODIUM 20 MG/1
40 TABLET, DELAYED RELEASE ORAL DAILY
Refills: 0 | Status: DISCONTINUED | OUTPATIENT
Start: 2019-01-01 | End: 2020-01-01

## 2019-01-01 RX ORDER — HEPARIN SODIUM 5000 [USP'U]/ML
3800 INJECTION INTRAVENOUS; SUBCUTANEOUS EVERY 6 HOURS
Refills: 0 | Status: DISCONTINUED | OUTPATIENT
Start: 2019-01-01 | End: 2019-01-01

## 2019-01-01 RX ORDER — FENTANYL CITRATE 50 UG/ML
50 INJECTION INTRAVENOUS
Refills: 0 | Status: DISCONTINUED | OUTPATIENT
Start: 2019-01-01 | End: 2019-01-01

## 2019-01-01 RX ORDER — FENTANYL CITRATE 50 UG/ML
25 INJECTION INTRAVENOUS
Refills: 0 | Status: DISCONTINUED | OUTPATIENT
Start: 2019-01-01 | End: 2019-01-01

## 2019-01-01 RX ORDER — ASPIRIN/CALCIUM CARB/MAGNESIUM 324 MG
300 TABLET ORAL ONCE
Refills: 0 | Status: COMPLETED | OUTPATIENT
Start: 2019-01-01 | End: 2019-01-01

## 2019-01-01 RX ORDER — CHLORHEXIDINE GLUCONATE 213 G/1000ML
15 SOLUTION TOPICAL EVERY 12 HOURS
Refills: 0 | Status: DISCONTINUED | OUTPATIENT
Start: 2019-01-01 | End: 2020-01-01

## 2019-01-01 RX ORDER — PIPERACILLIN AND TAZOBACTAM 4; .5 G/20ML; G/20ML
3.38 INJECTION, POWDER, LYOPHILIZED, FOR SOLUTION INTRAVENOUS EVERY 8 HOURS
Refills: 0 | Status: DISCONTINUED | OUTPATIENT
Start: 2019-01-01 | End: 2020-01-01

## 2019-01-01 RX ORDER — POTASSIUM CHLORIDE 20 MEQ
10 PACKET (EA) ORAL ONCE
Refills: 0 | Status: DISCONTINUED | OUTPATIENT
Start: 2019-01-01 | End: 2019-01-01

## 2019-01-01 RX ORDER — GLUCAGON INJECTION, SOLUTION 0.5 MG/.1ML
1 INJECTION, SOLUTION SUBCUTANEOUS ONCE
Refills: 0 | Status: DISCONTINUED | OUTPATIENT
Start: 2019-01-01 | End: 2019-01-01

## 2019-01-01 RX ORDER — MAGNESIUM SULFATE 500 MG/ML
2 VIAL (ML) INJECTION ONCE
Refills: 0 | Status: COMPLETED | OUTPATIENT
Start: 2019-01-01 | End: 2019-01-01

## 2019-01-01 RX ORDER — MIDAZOLAM HYDROCHLORIDE 1 MG/ML
2 INJECTION, SOLUTION INTRAMUSCULAR; INTRAVENOUS
Refills: 0 | Status: DISCONTINUED | OUTPATIENT
Start: 2019-01-01 | End: 2019-01-01

## 2019-01-01 RX ORDER — METOPROLOL TARTRATE 50 MG
2.5 TABLET ORAL ONCE
Refills: 0 | Status: COMPLETED | OUTPATIENT
Start: 2019-01-01 | End: 2019-01-01

## 2019-01-01 RX ORDER — POTASSIUM CHLORIDE 20 MEQ
10 PACKET (EA) ORAL ONCE
Refills: 0 | Status: COMPLETED | OUTPATIENT
Start: 2019-01-01 | End: 2019-01-01

## 2019-01-01 RX ORDER — HEPARIN SODIUM 5000 [USP'U]/ML
5000 INJECTION INTRAVENOUS; SUBCUTANEOUS EVERY 12 HOURS
Refills: 0 | Status: DISCONTINUED | OUTPATIENT
Start: 2019-01-01 | End: 2019-01-01

## 2019-01-01 RX ORDER — HYDRALAZINE HCL 50 MG
10 TABLET ORAL ONCE
Refills: 0 | Status: COMPLETED | OUTPATIENT
Start: 2019-01-01 | End: 2019-01-01

## 2019-01-01 RX ORDER — HYDROCORTISONE 20 MG
50 TABLET ORAL EVERY 6 HOURS
Refills: 0 | Status: DISCONTINUED | OUTPATIENT
Start: 2019-01-01 | End: 2019-01-01

## 2019-01-01 RX ORDER — ALBUTEROL 90 UG/1
2 AEROSOL, METERED ORAL EVERY 6 HOURS
Refills: 0 | Status: DISCONTINUED | OUTPATIENT
Start: 2019-01-01 | End: 2019-01-01

## 2019-01-01 RX ORDER — FENTANYL CITRATE 50 UG/ML
25 INJECTION INTRAVENOUS
Refills: 0 | Status: DISCONTINUED | OUTPATIENT
Start: 2019-01-01 | End: 2020-01-01

## 2019-01-01 RX ORDER — METOPROLOL TARTRATE 50 MG
5 TABLET ORAL EVERY 6 HOURS
Refills: 0 | Status: DISCONTINUED | OUTPATIENT
Start: 2019-01-01 | End: 2019-01-01

## 2019-01-01 RX ADMIN — SODIUM CHLORIDE 3 MILLILITER(S): 9 INJECTION INTRAMUSCULAR; INTRAVENOUS; SUBCUTANEOUS at 14:30

## 2019-01-01 RX ADMIN — ALBUTEROL 2 PUFF(S): 90 AEROSOL, METERED ORAL at 19:28

## 2019-01-01 RX ADMIN — CHLORHEXIDINE GLUCONATE 15 MILLILITER(S): 213 SOLUTION TOPICAL at 17:50

## 2019-01-01 RX ADMIN — ALBUTEROL 2 PUFF(S): 90 AEROSOL, METERED ORAL at 08:32

## 2019-01-01 RX ADMIN — Medication 6.09 MICROGRAM(S)/KG/MIN: at 00:14

## 2019-01-01 RX ADMIN — Medication 100 MILLIEQUIVALENT(S): at 20:40

## 2019-01-01 RX ADMIN — ALBUTEROL 2 PUFF(S): 90 AEROSOL, METERED ORAL at 00:05

## 2019-01-01 RX ADMIN — Medication 1: at 22:11

## 2019-01-01 RX ADMIN — Medication 100 MILLIEQUIVALENT(S): at 22:29

## 2019-01-01 RX ADMIN — Medication 50 GRAM(S): at 09:50

## 2019-01-01 RX ADMIN — Medication 50 MILLIGRAM(S): at 05:22

## 2019-01-01 RX ADMIN — SODIUM CHLORIDE 75 MILLILITER(S): 9 INJECTION, SOLUTION INTRAVENOUS at 07:48

## 2019-01-01 RX ADMIN — Medication 100 MILLIEQUIVALENT(S): at 16:46

## 2019-01-01 RX ADMIN — SODIUM CHLORIDE 75 MILLILITER(S): 9 INJECTION, SOLUTION INTRAVENOUS at 23:17

## 2019-01-01 RX ADMIN — CHLORHEXIDINE GLUCONATE 15 MILLILITER(S): 213 SOLUTION TOPICAL at 06:15

## 2019-01-01 RX ADMIN — SODIUM CHLORIDE 125 MILLILITER(S): 9 INJECTION, SOLUTION INTRAVENOUS at 16:21

## 2019-01-01 RX ADMIN — ALBUTEROL 2 PUFF(S): 90 AEROSOL, METERED ORAL at 01:59

## 2019-01-01 RX ADMIN — ALBUTEROL 2 PUFF(S): 90 AEROSOL, METERED ORAL at 13:28

## 2019-01-01 RX ADMIN — Medication 27.5 MILLIGRAM(S): at 06:27

## 2019-01-01 RX ADMIN — CHLORHEXIDINE GLUCONATE 15 MILLILITER(S): 213 SOLUTION TOPICAL at 05:49

## 2019-01-01 RX ADMIN — PIPERACILLIN AND TAZOBACTAM 25 GRAM(S): 4; .5 INJECTION, POWDER, LYOPHILIZED, FOR SOLUTION INTRAVENOUS at 01:52

## 2019-01-01 RX ADMIN — ALBUTEROL 2 PUFF(S): 90 AEROSOL, METERED ORAL at 07:42

## 2019-01-01 RX ADMIN — Medication 1: at 17:52

## 2019-01-01 RX ADMIN — ALBUTEROL 2 PUFF(S): 90 AEROSOL, METERED ORAL at 20:21

## 2019-01-01 RX ADMIN — PIPERACILLIN AND TAZOBACTAM 200 GRAM(S): 4; .5 INJECTION, POWDER, LYOPHILIZED, FOR SOLUTION INTRAVENOUS at 16:44

## 2019-01-01 RX ADMIN — HEPARIN SODIUM 5000 UNIT(S): 5000 INJECTION INTRAVENOUS; SUBCUTANEOUS at 17:45

## 2019-01-01 RX ADMIN — PIPERACILLIN AND TAZOBACTAM 25 GRAM(S): 4; .5 INJECTION, POWDER, LYOPHILIZED, FOR SOLUTION INTRAVENOUS at 16:50

## 2019-01-01 RX ADMIN — Medication 40 MILLIEQUIVALENT(S): at 02:35

## 2019-01-01 RX ADMIN — PIPERACILLIN AND TAZOBACTAM 25 GRAM(S): 4; .5 INJECTION, POWDER, LYOPHILIZED, FOR SOLUTION INTRAVENOUS at 07:48

## 2019-01-01 RX ADMIN — Medication 6.09 MICROGRAM(S)/KG/MIN: at 02:51

## 2019-01-01 RX ADMIN — Medication 40 MILLIEQUIVALENT(S): at 22:29

## 2019-01-01 RX ADMIN — SODIUM CHLORIDE 75 MILLILITER(S): 9 INJECTION, SOLUTION INTRAVENOUS at 11:20

## 2019-01-01 RX ADMIN — Medication 50 MILLIGRAM(S): at 18:40

## 2019-01-01 RX ADMIN — SODIUM CHLORIDE 125 MILLILITER(S): 9 INJECTION, SOLUTION INTRAVENOUS at 15:35

## 2019-01-01 RX ADMIN — ALBUTEROL 2 PUFF(S): 90 AEROSOL, METERED ORAL at 00:33

## 2019-01-01 RX ADMIN — Medication 10 MILLIGRAM(S): at 22:59

## 2019-01-01 RX ADMIN — PIPERACILLIN AND TAZOBACTAM 25 GRAM(S): 4; .5 INJECTION, POWDER, LYOPHILIZED, FOR SOLUTION INTRAVENOUS at 00:18

## 2019-01-01 RX ADMIN — FENTANYL CITRATE 25 MICROGRAM(S): 50 INJECTION INTRAVENOUS at 13:31

## 2019-01-01 RX ADMIN — Medication 1: at 20:25

## 2019-01-01 RX ADMIN — HEPARIN SODIUM 750 UNIT(S)/HR: 5000 INJECTION INTRAVENOUS; SUBCUTANEOUS at 22:50

## 2019-01-01 RX ADMIN — Medication 400 GRAM(S): at 16:21

## 2019-01-01 RX ADMIN — HEPARIN SODIUM 1050 UNIT(S)/HR: 5000 INJECTION INTRAVENOUS; SUBCUTANEOUS at 02:50

## 2019-01-01 RX ADMIN — Medication 300 MILLIGRAM(S): at 16:45

## 2019-01-01 RX ADMIN — PANTOPRAZOLE SODIUM 40 MILLIGRAM(S): 20 TABLET, DELAYED RELEASE ORAL at 11:57

## 2019-01-01 RX ADMIN — HEPARIN SODIUM 800 UNIT(S)/HR: 5000 INJECTION INTRAVENOUS; SUBCUTANEOUS at 09:50

## 2019-01-01 RX ADMIN — Medication 100 GRAM(S): at 21:53

## 2019-01-01 RX ADMIN — HEPARIN SODIUM 5000 UNIT(S): 5000 INJECTION INTRAVENOUS; SUBCUTANEOUS at 06:15

## 2019-01-01 RX ADMIN — Medication 27.5 MILLIGRAM(S): at 17:52

## 2019-01-01 RX ADMIN — Medication 27.5 MILLIGRAM(S): at 06:15

## 2019-01-01 RX ADMIN — Medication 5 MILLIGRAM(S): at 14:19

## 2019-01-01 RX ADMIN — SODIUM CHLORIDE 125 MILLILITER(S): 9 INJECTION, SOLUTION INTRAVENOUS at 22:28

## 2019-01-01 RX ADMIN — DEXTROSE MONOHYDRATE, SODIUM CHLORIDE, AND POTASSIUM CHLORIDE 125 MILLILITER(S): 50; .745; 4.5 INJECTION, SOLUTION INTRAVENOUS at 17:45

## 2019-01-01 RX ADMIN — MIDAZOLAM HYDROCHLORIDE 2 MILLIGRAM(S): 1 INJECTION, SOLUTION INTRAMUSCULAR; INTRAVENOUS at 16:53

## 2019-01-01 RX ADMIN — CHLORHEXIDINE GLUCONATE 15 MILLILITER(S): 213 SOLUTION TOPICAL at 05:22

## 2019-01-01 RX ADMIN — CHLORHEXIDINE GLUCONATE 15 MILLILITER(S): 213 SOLUTION TOPICAL at 17:45

## 2019-01-01 RX ADMIN — FENTANYL CITRATE 25 MICROGRAM(S): 50 INJECTION INTRAVENOUS at 17:59

## 2019-01-01 RX ADMIN — Medication 27.5 MILLIGRAM(S): at 17:50

## 2019-01-01 RX ADMIN — Medication 25 MILLIGRAM(S): at 18:40

## 2019-01-01 RX ADMIN — PANTOPRAZOLE SODIUM 40 MILLIGRAM(S): 20 TABLET, DELAYED RELEASE ORAL at 11:20

## 2019-01-01 RX ADMIN — Medication 2.5 MILLIGRAM(S): at 10:00

## 2019-01-01 RX ADMIN — MIDAZOLAM HYDROCHLORIDE 2 MILLIGRAM(S): 1 INJECTION, SOLUTION INTRAMUSCULAR; INTRAVENOUS at 23:34

## 2019-01-01 RX ADMIN — HEPARIN SODIUM 750 UNIT(S)/HR: 5000 INJECTION INTRAVENOUS; SUBCUTANEOUS at 16:21

## 2019-01-01 RX ADMIN — Medication 27.5 MILLIGRAM(S): at 05:49

## 2019-01-01 RX ADMIN — FENTANYL CITRATE 25 MICROGRAM(S): 50 INJECTION INTRAVENOUS at 21:47

## 2019-01-01 RX ADMIN — Medication 27.5 MILLIGRAM(S): at 17:07

## 2019-01-01 RX ADMIN — ALBUTEROL 2 PUFF(S): 90 AEROSOL, METERED ORAL at 19:11

## 2019-01-01 RX ADMIN — FENTANYL CITRATE 25 MICROGRAM(S): 50 INJECTION INTRAVENOUS at 05:49

## 2019-01-01 RX ADMIN — PIPERACILLIN AND TAZOBACTAM 25 GRAM(S): 4; .5 INJECTION, POWDER, LYOPHILIZED, FOR SOLUTION INTRAVENOUS at 08:26

## 2019-01-01 RX ADMIN — Medication 5 MILLIGRAM(S): at 21:38

## 2019-01-01 RX ADMIN — PIPERACILLIN AND TAZOBACTAM 25 GRAM(S): 4; .5 INJECTION, POWDER, LYOPHILIZED, FOR SOLUTION INTRAVENOUS at 01:03

## 2019-01-01 RX ADMIN — PIPERACILLIN AND TAZOBACTAM 25 GRAM(S): 4; .5 INJECTION, POWDER, LYOPHILIZED, FOR SOLUTION INTRAVENOUS at 08:21

## 2019-01-01 RX ADMIN — CHLORHEXIDINE GLUCONATE 15 MILLILITER(S): 213 SOLUTION TOPICAL at 17:07

## 2019-01-01 RX ADMIN — ALBUTEROL 2 PUFF(S): 90 AEROSOL, METERED ORAL at 08:12

## 2019-01-01 RX ADMIN — HEPARIN SODIUM 1050 UNIT(S)/HR: 5000 INJECTION INTRAVENOUS; SUBCUTANEOUS at 19:34

## 2019-01-01 RX ADMIN — CHLORHEXIDINE GLUCONATE 15 MILLILITER(S): 213 SOLUTION TOPICAL at 18:58

## 2019-01-01 RX ADMIN — ALBUTEROL 2 PUFF(S): 90 AEROSOL, METERED ORAL at 20:03

## 2019-01-01 RX ADMIN — PIPERACILLIN AND TAZOBACTAM 25 GRAM(S): 4; .5 INJECTION, POWDER, LYOPHILIZED, FOR SOLUTION INTRAVENOUS at 23:21

## 2019-01-01 RX ADMIN — PIPERACILLIN AND TAZOBACTAM 25 GRAM(S): 4; .5 INJECTION, POWDER, LYOPHILIZED, FOR SOLUTION INTRAVENOUS at 16:37

## 2019-01-01 RX ADMIN — ALBUTEROL 2 PUFF(S): 90 AEROSOL, METERED ORAL at 07:38

## 2019-01-01 RX ADMIN — Medication 100 GRAM(S): at 16:21

## 2019-01-01 RX ADMIN — PIPERACILLIN AND TAZOBACTAM 25 GRAM(S): 4; .5 INJECTION, POWDER, LYOPHILIZED, FOR SOLUTION INTRAVENOUS at 16:21

## 2019-01-01 RX ADMIN — HEPARIN SODIUM 950 UNIT(S)/HR: 5000 INJECTION INTRAVENOUS; SUBCUTANEOUS at 13:07

## 2019-01-01 RX ADMIN — Medication 5 MILLIGRAM(S): at 17:24

## 2019-01-01 RX ADMIN — DEXTROSE MONOHYDRATE, SODIUM CHLORIDE, AND POTASSIUM CHLORIDE 125 MILLILITER(S): 50; .745; 4.5 INJECTION, SOLUTION INTRAVENOUS at 02:35

## 2019-01-01 RX ADMIN — Medication 2: at 00:18

## 2019-01-01 RX ADMIN — CHLORHEXIDINE GLUCONATE 15 MILLILITER(S): 213 SOLUTION TOPICAL at 17:24

## 2019-01-01 RX ADMIN — SODIUM CHLORIDE 75 MILLILITER(S): 9 INJECTION, SOLUTION INTRAVENOUS at 01:30

## 2019-01-01 RX ADMIN — PIPERACILLIN AND TAZOBACTAM 25 GRAM(S): 4; .5 INJECTION, POWDER, LYOPHILIZED, FOR SOLUTION INTRAVENOUS at 23:16

## 2019-01-01 RX ADMIN — ALBUTEROL 2 PUFF(S): 90 AEROSOL, METERED ORAL at 01:30

## 2019-01-01 RX ADMIN — Medication 100 MILLIEQUIVALENT(S): at 23:41

## 2019-01-01 RX ADMIN — PIPERACILLIN AND TAZOBACTAM 25 GRAM(S): 4; .5 INJECTION, POWDER, LYOPHILIZED, FOR SOLUTION INTRAVENOUS at 08:11

## 2019-01-01 RX ADMIN — ALBUTEROL 2 PUFF(S): 90 AEROSOL, METERED ORAL at 13:58

## 2019-01-01 RX ADMIN — HEPARIN SODIUM 850 UNIT(S)/HR: 5000 INJECTION INTRAVENOUS; SUBCUTANEOUS at 06:42

## 2019-01-01 RX ADMIN — Medication 50 MILLIGRAM(S): at 00:32

## 2019-01-01 RX ADMIN — PANTOPRAZOLE SODIUM 40 MILLIGRAM(S): 20 TABLET, DELAYED RELEASE ORAL at 22:31

## 2019-01-01 RX ADMIN — POTASSIUM PHOSPHATE, MONOBASIC POTASSIUM PHOSPHATE, DIBASIC 83.33 MILLIMOLE(S): 236; 224 INJECTION, SOLUTION INTRAVENOUS at 17:44

## 2019-01-01 RX ADMIN — HEPARIN SODIUM 850 UNIT(S)/HR: 5000 INJECTION INTRAVENOUS; SUBCUTANEOUS at 02:59

## 2019-01-01 RX ADMIN — SODIUM CHLORIDE 125 MILLILITER(S): 9 INJECTION, SOLUTION INTRAVENOUS at 22:58

## 2019-01-01 RX ADMIN — Medication 5 MILLIGRAM(S): at 11:36

## 2019-01-01 RX ADMIN — SODIUM CHLORIDE 125 MILLILITER(S): 9 INJECTION, SOLUTION INTRAVENOUS at 06:56

## 2019-01-01 RX ADMIN — Medication 6.09 MICROGRAM(S)/KG/MIN: at 23:22

## 2019-01-01 RX ADMIN — HEPARIN SODIUM 750 UNIT(S)/HR: 5000 INJECTION INTRAVENOUS; SUBCUTANEOUS at 20:40

## 2019-01-01 RX ADMIN — Medication 27.5 MILLIGRAM(S): at 17:45

## 2019-01-01 RX ADMIN — PANTOPRAZOLE SODIUM 40 MILLIGRAM(S): 20 TABLET, DELAYED RELEASE ORAL at 11:36

## 2019-01-01 RX ADMIN — ALBUTEROL 2 PUFF(S): 90 AEROSOL, METERED ORAL at 14:04

## 2019-01-01 RX ADMIN — PIPERACILLIN AND TAZOBACTAM 25 GRAM(S): 4; .5 INJECTION, POWDER, LYOPHILIZED, FOR SOLUTION INTRAVENOUS at 17:45

## 2019-01-01 RX ADMIN — Medication 2: at 04:43

## 2019-01-01 RX ADMIN — Medication 6.09 MICROGRAM(S)/KG/MIN: at 08:11

## 2019-01-01 RX ADMIN — CHLORHEXIDINE GLUCONATE 15 MILLILITER(S): 213 SOLUTION TOPICAL at 05:23

## 2019-01-01 RX ADMIN — Medication 27.5 MILLIGRAM(S): at 05:22

## 2019-01-01 RX ADMIN — PROPOFOL 1.95 MICROGRAM(S)/KG/MIN: 10 INJECTION, EMULSION INTRAVENOUS at 08:25

## 2019-12-27 NOTE — CONSULT NOTE ADULT - SUBJECTIVE AND OBJECTIVE BOX
History of Present Illness: The patient is a 59 year old female with unknown past medical history who presents with cardiac arrest. No history could be obtained from patient. As per notes, she was at home with a friend talking, had seizure-like activity, and stopped breathing. EMS came, she was given 4 of epinephrine and was shocked twice.     Past Medical/Surgical History:  Unknown    Medications:  Unknown    Family History: Non-contributory family history of premature cardiovascular atherosclerotic disease    Social History: (+) tobacco, alcohol or drug use    Review of Systems:  Unable to obtain    Physical Exam:  Vitals:        Vital Signs Last 24 Hrs  T(C): 35.3 (27 Dec 2019 16:48), Max: 35.8 (27 Dec 2019 14:50)  T(F): 95.5 (27 Dec 2019 16:48), Max: 96.5 (27 Dec 2019 14:50)  HR: 98 (27 Dec 2019 16:48) (64 - 98)  BP: 153/73 (27 Dec 2019 16:48) (64/47 - 158/77)  BP(mean): 95 (27 Dec 2019 16:48) (94 - 99)  RR: 20 (27 Dec 2019 16:48) (16 - 21)  SpO2: 93% (27 Dec 2019 16:48) (93% - 100%)  General: Sedated  HEENT: Intubated  Neck: No JVD, no carotid bruit  Lungs: Coarse bilaterally  CV: RRR, nl S1/S2, no M/R/G  Abdomen: S/NT/ND, +BS  Extremities: No LE edema, no cyanosis  Neuro: Sedated  Skin: No rash    Labs:                        11.2   10.58 )-----------( 282      ( 27 Dec 2019 14:55 )             36.1     12-27    144  |  105  |  10  ----------------------------<  172<H>  2.7<LL>   |  17<L>  |  1.42<H>    Ca    7.9<L>      27 Dec 2019 14:55  Mg     2.2     12-27    TPro  5.6<L>  /  Alb  2.6<L>  /  TBili  0.8  /  DBili  x   /  AST  463<H>  /  ALT  229<H>  /  AlkPhos  155<H>  12-27    CARDIAC MARKERS ( 27 Dec 2019 14:55 )  .394 ng/mL / x     / x     / x     / x          PT/INR - ( 27 Dec 2019 14:55 )   PT: 13.2 sec;   INR: 1.20 ratio         PTT - ( 27 Dec 2019 14:55 )  PTT:34.4 sec    ECG: Junctional rhythm, normal axis, diffuse ST depressions, long QT History of Present Illness: The patient is a 59 year old female with unknown past medical history who presents with cardiac arrest. No history could be obtained from patient. As per notes, she was at home with a friend talking, had seizure-like activity, and stopped breathing. EMS came, she was given 4 of epinephrine and was shocked twice.     Past Medical/Surgical History:  Unknown    Medications:  Unknown    Family History: Non-contributory family history of premature cardiovascular atherosclerotic disease    Social History: (+) tobacco, alcohol, (+) opiate use    Review of Systems:  Unable to obtain    Physical Exam:  Vitals:        Vital Signs Last 24 Hrs  T(C): 35.3 (27 Dec 2019 16:48), Max: 35.8 (27 Dec 2019 14:50)  T(F): 95.5 (27 Dec 2019 16:48), Max: 96.5 (27 Dec 2019 14:50)  HR: 98 (27 Dec 2019 16:48) (64 - 98)  BP: 153/73 (27 Dec 2019 16:48) (64/47 - 158/77)  BP(mean): 95 (27 Dec 2019 16:48) (94 - 99)  RR: 20 (27 Dec 2019 16:48) (16 - 21)  SpO2: 93% (27 Dec 2019 16:48) (93% - 100%)  General: Sedated  HEENT: Intubated  Neck: No JVD, no carotid bruit  Lungs: Coarse bilaterally  CV: RRR, nl S1/S2, no M/R/G  Abdomen: S/NT/ND, +BS  Extremities: No LE edema, no cyanosis  Neuro: Sedated  Skin: No rash    Labs:                        11.2   10.58 )-----------( 282      ( 27 Dec 2019 14:55 )             36.1     12-27    144  |  105  |  10  ----------------------------<  172<H>  2.7<LL>   |  17<L>  |  1.42<H>    Ca    7.9<L>      27 Dec 2019 14:55  Mg     2.2     12-27    TPro  5.6<L>  /  Alb  2.6<L>  /  TBili  0.8  /  DBili  x   /  AST  463<H>  /  ALT  229<H>  /  AlkPhos  155<H>  12-27    CARDIAC MARKERS ( 27 Dec 2019 14:55 )  .394 ng/mL / x     / x     / x     / x          PT/INR - ( 27 Dec 2019 14:55 )   PT: 13.2 sec;   INR: 1.20 ratio         PTT - ( 27 Dec 2019 14:55 )  PTT:34.4 sec    ECG: Junctional rhythm, normal axis, diffuse ST depressions, long QT

## 2019-12-27 NOTE — ED ADULT NURSE REASSESSMENT NOTE - NS ED NURSE REASSESS COMMENT FT1
Patient BIBEMS s/p cardiac arrest, ROSC was obtained upon first pulse check at 1420. Patient was intubated in field by EMS. ET tube 6.5 at 22cm lip line. Patient received unresponsive to verbal and tactile stimuli, pupils fixed and dilated. IV access were placed and fluid bolus given as per verbal order of MD.  Rectal temperature taken. Wynne catheter placed. On continuous cardiac monitoring, nursing care ongoing.

## 2019-12-27 NOTE — ED PROVIDER NOTE - ENMT, MLM
Airway patent, Nasal mucosa clear. Mouth with normal mucosa. Throat has no vesicles, no oropharyngeal exudates and uvula is midline. Airway patent, Nasal mucosa clear. Intubated

## 2019-12-27 NOTE — ED PROVIDER NOTE - OBJECTIVE STATEMENT
60 y/o female presents to the ED BIBA from home c/o cardiac arrest. Per EMS pt was at home with her friend who told them they were talking, then pt suddenly seized and stopped breathing, upon EMS arrival pt without pulse. Pt was intubated, received 4 epi and 2 shocks en route, was able to get pulse back several times but arrested again. Upon arrival to the ED pt with pulse.   Further hx unattainable secondary to pt unresponsive. 58 y/o female with unknown PMHx presents to the ED BIBA from home c/o cardiac arrest. Per EMS pt was at home with her friend who told them they were talking, then pt suddenly seized and stopped breathing, upon EMS arrival pt without pulse. Pt was intubated, received 4 epi and 2 shocks en route, was able to get pulse back several times but arrested again. Upon arrival to the ED pt with pulse.   Further hx unattainable secondary to pt unresponsive.

## 2019-12-27 NOTE — ED ADULT TRIAGE NOTE - CHIEF COMPLAINT QUOTE
" She had a seizure, became unresponsive, not breathing, she lost her pulse, went into cardiac arrest "  Pt has a pulse on arrival to ED

## 2019-12-27 NOTE — ED PROVIDER NOTE - CLINICAL SUMMARY MEDICAL DECISION MAKING FREE TEXT BOX
pt presents post cardiac arrest with return of spontaneous circulation, will obtain screening labs, pan scan and admit.

## 2019-12-27 NOTE — H&P ADULT - HISTORY OF PRESENT ILLNESS
HPI: 58 y/o F w/ pmh of opioid abuse, depression, ovarian ca, presented to [facility] earlier today in cardiac arrest. Pt unable to provide hx, no family present. Per EMS pt was reported having been talking to friends when she suddenly had a seizure and then stopped breathing, per EMS friends immediately called 911 and EMS found pt in vfib. ACLS protocol was started pt received x4 epi and 2 shock and ROSC was achieve while in route to the ED pt arrest again and came into the trauma bay in cardiac arrest. ROSC was achieved again during 1st pulse checked. During my evaluation in the ED pt was bradycardic 40-60s, SBP 60 for which she was given 3L of wide open w/ some improvement in fluids. Pt underwent PAN-SCAN to r/o any acute brain/cervical/chest/abd/pelvis pathology and admitted to the ICU for further tx and evaluation. No further information available at this time.

## 2019-12-27 NOTE — ED PROVIDER NOTE - CONSTITUTIONAL, MLM
normal... Well appearing, awake, alert, oriented to person, place, time/situation and in no apparent distress. in acute distress, intubated

## 2019-12-27 NOTE — H&P ADULT - NSHPPHYSICALEXAM_GEN_ALL_CORE
Gen: intubated and non-responsive not on any sedation  Neuro: obtunded, no gag reflux noted, no responds to verbal/tactile or painful stimuli   HEENT: pupils 2.5mm non-reactive to light, +R. EJ line placed  Resp: Good air entry b/l  CVS: +RRR  Abd: BSx4, soft, nt/nd  Ext: no edema, +L. IO   Skin: warm/dry

## 2019-12-27 NOTE — CONSULT NOTE ADULT - SUBJECTIVE AND OBJECTIVE BOX
Date/Time Patient Seen:  		  Referring MD:   Data Reviewed	       Patient is a 59y old  Female who presents with a chief complaint of     Subjective/HPI    smoker  opioid dep  lives with   no children  non drinker  s/p card arrest - in the house -       HISTORY OF PRESENT ILLNESS:    International Travel:  International Travel within 21 days? No(1)     Preferred Language to Address Healthcare:  · Preferred Language to Address Healthcare	English     Child Abuse Assessment (patients less than 13 yrs):  Chief Complaint: post cardiac arrest.    · Chief Complaint: The patient is a 59y Female complaining of  · Unable to Obtain: Unresponsive  · Details: Unattainable due to pt cardiac arrest.  · HPI Objective Statement: 58 y/o female with unknown PMHx presents to the ED BIBA from home c/o cardiac arrest. Per EMS pt was at home with her friend who told them they were talking, then pt suddenly seized and stopped breathing, upon EMS arrival pt without pulse. Pt was intubated, received 4 epi and 2 shocks en route, was able to get pulse back several times but arrested again. Upon arrival to the ED pt with pulse.   Further hx unattainable secondary to pt unresponsive.    HIV:    HIV Status:  · Offered: Unable to consent due to medical condition     PAST MEDICAL & SURGICAL HISTORY:        Medication list         MEDICATIONS  (STANDING):  ALBUTerol    90 MICROgram(s) HFA Inhaler 2 Puff(s) Inhalation every 6 hours  chlorhexidine 0.12% Liquid 15 milliLiter(s) Oral Mucosa every 12 hours  sodium chloride 0.9% lock flush 3 milliLiter(s) IV Push once    MEDICATIONS  (PRN):         Vitals log        ICU Vital Signs Last 24 Hrs  T(C): --  T(F): --  HR: 81 (27 Dec 2019 15:00) (64 - 81)  BP: 87/51 (27 Dec 2019 14:23) (87/51 - 87/51)  BP(mean): --  ABP: --  ABP(mean): --  RR: 16 (27 Dec 2019 14:23) (16 - 16)  SpO2: 100% (27 Dec 2019 15:00) (100% - 100%)       Mode: AC/ CMV (Assist Control/ Continuous Mandatory Ventilation)  RR (machine): 16  TV (machine): 400  FiO2: 100  PEEP: 5  ITime: 1  MAP: 14  PIP: 18      Input and Output:  I&O's Detail      Lab Data                        11.2   10.58 )-----------( 282      ( 27 Dec 2019 14:55 )             36.1           ABG - ( 27 Dec 2019 14:52 )  pH, Arterial: x     pH, Blood: 7.09  /  pCO2: 48    /  pO2: 101   / HCO3: 13    / Base Excess: -14.0 /  SaO2: 94                      Review of Systems	      Objective     Physical Examination    heart s1s2  lung dec BS  abd soft  et tube  ej IV    Pertinent Lab findings & Imaging      Wynne:  NO   Adequate UO     I&O's Detail           Discussed with:     Cultures:	        Radiology

## 2019-12-27 NOTE — PROGRESS NOTE ADULT - ASSESSMENT
58 y/o F w/ pmh of opioid abuse, depression, ovarian ca, presented to [facility] earlier today in cardiac arrest. Pt unable to provide hx, no family present. Per EMS pt was reported having been talking to friends when she suddenly had a seizure and then stopped breathing, per EMS friends immediately called 911 and EMS found pt in vfib. ACLS protocol was started pt received x4 epi and 2 shock and ROSC was achieve while in route to the ED pt arrest again and came into the trauma bay in cardiac arrest. ROSC was achieved again during 1st pulse checked. Pt now admitted to the SPCU for cardiac arrest.    -Neuro:  -Cardiac: s/p cardiac arrest, will start pt on hypothermia protocol, maintain core temp at 36, maintain MAP >90,   -Resp: Cont lung protective ventilation, titrate fio2 down to 21% if possible and maintain o2 >92%

## 2019-12-27 NOTE — CONSULT NOTE ADULT - ASSESSMENT
Seen s/p cardiac arrest.  Seized in the beginning of the symptoms.  CT Head - diffuse anoxic changes to the brain with generalized cerebral edema and loss of gray-white differentiation. No obvious hemorrhage.  Feeble but positive brain stem functions.  Anoxic Encephalopathy.  Positive Troponin.  Pt is breathing over the vent.   Also B/l Corneal reflexes are positive.  Biting at the ET tube.  Load with Depacon 1000 mg IV X1 now.  Depacon 500 mg IV Q12h from tomorrow am.  No Video EEG is monitoring available in [facility], hence I called Neurologist Dr. Lambert in [facility] For transfer  - Epileptologist called back for acceptance  - Pt will be transferred if hemodynamically stable. Not Stable at this point.   Given clinical presentation and signs of Anoxia on CT Head  - Overall prognosis seems very poor.   D/w SPCU PA and covering nurse.  Would follow.

## 2019-12-27 NOTE — CONSULT NOTE ADULT - SUBJECTIVE AND OBJECTIVE BOX
Patient is a 59y old  Female who presents s/p cardiac arrest (27 Dec 2019 16:09)    HPI:  58 y/o female with unknown PMHx presents to the ED BIBA from home c/o cardiac arrest. Per EMS pt was at home with her friend who told them they were talking, then pt   suddenly seized and stopped breathing, upon EMS arrival pt without pulse. Pt was intubated, received 4 epi and 2 shocks en route, was able to get pulse back several times but  arrested again. Upon arrival to the ED pt with pulse.   Further hx unattainable secondary to pt unresponsive.  pH was 7.09  Positive Troponin.  CT Head showed signs of anoxic Brain Injury.  Pt is intubated on vent.  On Hypothermia Protocol.    PAST MEDICAL & SURGICAL HISTORY: Unknown    MEDICATIONS  (STANDING):  ALBUTerol    90 MICROgram(s) HFA Inhaler 2 Puff(s) Inhalation every 6 hours  chlorhexidine 0.12% Liquid 15 milliLiter(s) Oral Mucosa every 12 hours  hydrocortisone sodium succinate Injectable 50 milliGRAM(s) IV Push every 6 hours  lactated ringers. 1000 milliLiter(s) (125 mL/Hr) IV Continuous <Continuous>  pantoprazole  Injectable 40 milliGRAM(s) IV Push daily    MEDICATIONS  (PRN):  fentaNYL    Injectable 25 MICROGram(s) IV Push every 1 hour PRN pain, distress, dyspnea  midazolam Injectable 2 milliGRAM(s) IV Push every 30 minutes PRN distress, dyspnea, anxiety, myoclonus    Allergies    No Known Allergies    SOCIAL HISTORY: Unkown    FAMILY HISTORY: Unknown    REVIEW OF SYSTEMS: UTO     PHYSICAL EXAM:  Vital Signs Last 24 Hrs  T(F): 95 (19 @ 16:39)  HR: 80 (19 @ 16:06)  BP: 133/67 (19 @ 15:37)  RR: 19 (19 @ 15:37)    GENERAL: NAD, well-groomed, well-developed  HEAD:  Atraumatic, Normocephalic  EYES: EOMI, PERRLA, conjunctiva and sclera clear  NECK: Supple, No JVD, thyroid non-palpable    Neurological Exam  -    Intubated on vent.  Not on any sedation. Not on pressors.  On Hypothermia protocol.  No response to pain is seen.  Pupil 2.5 mm - Not reacting to light.  B/L Corneal Reflexes  -are positive.  Gag reflex   -absent.  No cough response is seen.  Breathing over the vent. Set rate is 16, breathing at 26  Neck is supple.        LABS:                        11.2   10.58 )-----------( 282      ( 27 Dec 2019 14:55 )             36.1         144  |  105  |  10  ----------------------------<  172<H>  2.7<LL>   |  17<L>  |  1.42<H>    Ca    7.9<L>      27 Dec 2019 14:55  Mg     2.2         TPro  5.6<L>  /  Alb  2.6<L>  /  TBili  0.8  /  DBili  x   /  AST  463<H>  /  ALT  229<H>  /  AlkPhos  155<H>      PT/INR - ( 27 Dec 2019 14:55 )   PT: 13.2 sec;   INR: 1.20 ratio      PTT - ( 27 Dec 2019 14:55 )  PTT:34.4 sec  Urinalysis Basic - ( 27 Dec 2019 14:59 )    Color: Yellow / Appearance: Clear / S.010 / pH: x  Gluc: x / Ketone: Negative  / Bili: Negative / Urobili: Negative mg/dL   Blood: x / Protein: Negative mg/dL / Nitrite: Negative   Leuk Esterase: Trace / RBC: x / WBC 3-5   Sq Epi: x / Non Sq Epi: Occasional / Bacteria: Occasional    RADIOLOGY & ADDITIONAL STUDIES:    < from: CT Head No Cont (19 @ 15:28) >    IMPRESSION:    1)  diffuse anoxic changes to the brain with generalized cerebral edema and loss of gray-white differentiation. Follow-up MR imaging may be considered for further assessment, along with follow-up CT imaging.  2)  no obvious hemorrhage.    < end of copied text >    < from: CT Cervical Spine No Cont (19 @ 15:28) >    IMPRESSION:    No acute fracture identified. Multilevel degenerative changes.    < end of copied text > Patient is a 59y old  Female who presents s/p cardiac arrest (27 Dec 2019 16:09)    HPI:  60 y/o female with unknown PMHx presents to the ED BIBA from home c/o cardiac arrest. Per EMS pt was at home with her friend who told them they were talking, then pt   suddenly seized and stopped breathing, upon EMS arrival pt without pulse. Pt was intubated, received 4 epi and 2 shocks en route, was able to get pulse back several times but  arrested again. Upon arrival to the ED pt with pulse.   Further hx unattainable secondary to pt unresponsive.  pH was 7.09  Positive Troponin.  CT Head showed signs of anoxic Brain Injury.  Pt is intubated on vent.  On Hypothermia Protocol.    PAST MEDICAL & SURGICAL HISTORY: Unknown    MEDICATIONS  (STANDING):  ALBUTerol    90 MICROgram(s) HFA Inhaler 2 Puff(s) Inhalation every 6 hours  chlorhexidine 0.12% Liquid 15 milliLiter(s) Oral Mucosa every 12 hours  hydrocortisone sodium succinate Injectable 50 milliGRAM(s) IV Push every 6 hours  lactated ringers. 1000 milliLiter(s) (125 mL/Hr) IV Continuous <Continuous>  pantoprazole  Injectable 40 milliGRAM(s) IV Push daily    MEDICATIONS  (PRN):  fentaNYL    Injectable 25 MICROGram(s) IV Push every 1 hour PRN pain, distress, dyspnea  midazolam Injectable 2 milliGRAM(s) IV Push every 30 minutes PRN distress, dyspnea, anxiety, myoclonus    Allergies    No Known Allergies    SOCIAL HISTORY: Unkown    FAMILY HISTORY: Unknown    REVIEW OF SYSTEMS: UTO     PHYSICAL EXAM:  Vital Signs Last 24 Hrs  T(F): 95 (19 @ 16:39)  HR: 80 (19 @ 16:06)  BP: 133/67 (19 @ 15:37)  RR: 19 (19 @ 15:37)    GENERAL: NAD, well-groomed, well-developed  HEAD:  Atraumatic, Normocephalic  EYES: EOMI, PERRLA, conjunctiva and sclera clear  NECK: Supple, No JVD, thyroid non-palpable    Neurological Exam  -    Intubated on vent.  Not on any sedation. Not on pressors.  On Hypothermia protocol.  No response to pain is seen. Pain inflicted at b/l Ear lobes.  Pupil 2.5 mm - Not reacting to light.  B/L Corneal Reflexes  -are positive.  Doll's eyes  - Absent  Gag reflex   -absent.  No cough response is seen.  Breathing over the vent. Set rate is 16, breathing at 26  Neck is supple.  Slight quivering movement of eye lids are seen other wise no myoclonic jerks or seizure activity is seen.      LABS:                        11.2   10.58 )-----------( 282      ( 27 Dec 2019 14:55 )             36.1         144  |  105  |  10  ----------------------------<  172<H>  2.7<LL>   |  17<L>  |  1.42<H>    Ca    7.9<L>      27 Dec 2019 14:55  Mg     2.2         TPro  5.6<L>  /  Alb  2.6<L>  /  TBili  0.8  /  DBili  x   /  AST  463<H>  /  ALT  229<H>  /  AlkPhos  155<H>      PT/INR - ( 27 Dec 2019 14:55 )   PT: 13.2 sec;   INR: 1.20 ratio      PTT - ( 27 Dec 2019 14:55 )  PTT:34.4 sec  Urinalysis Basic - ( 27 Dec 2019 14:59 )    Color: Yellow / Appearance: Clear / S.010 / pH: x  Gluc: x / Ketone: Negative  / Bili: Negative / Urobili: Negative mg/dL   Blood: x / Protein: Negative mg/dL / Nitrite: Negative   Leuk Esterase: Trace / RBC: x / WBC 3-5   Sq Epi: x / Non Sq Epi: Occasional / Bacteria: Occasional    RADIOLOGY & ADDITIONAL STUDIES:    < from: CT Head No Cont (19 @ 15:28) >    IMPRESSION:    1)  diffuse anoxic changes to the brain with generalized cerebral edema and loss of gray-white differentiation. Follow-up MR imaging may be considered for further assessment, along with follow-up CT imaging.  2)  no obvious hemorrhage.    < end of copied text >    < from: CT Cervical Spine No Cont (19 @ 15:28) >    IMPRESSION:    No acute fracture identified. Multilevel degenerative changes.    < end of copied text >

## 2019-12-27 NOTE — ED ADULT NURSE REASSESSMENT NOTE - NS ED NURSE REASSESS COMMENT FT1
Patient transferred to CT accompanied by RN, respiratory tech and CNA. On portable cardiac monitoring.

## 2019-12-27 NOTE — CONSULT NOTE ADULT - PROBLEM SELECTOR RECOMMENDATION 3
resp failure  rpt ABG  vent support  HOB elev  PPI  proventil   active smoker  pulse ox and cardiac monitoring  ct chest reviewed

## 2019-12-27 NOTE — ED PROVIDER NOTE - CARE PLAN
Principal Discharge DX:	Cardiac arrest  Secondary Diagnosis:	Opioid dependence  Secondary Diagnosis:	Respiratory failure  Secondary Diagnosis:	Acute MI  Secondary Diagnosis:	Anoxic brain damage  Secondary Diagnosis:	Acidosis  Secondary Diagnosis:	Hypokalemia

## 2019-12-27 NOTE — PROVIDER CONTACT NOTE (EICU) - ASSESSMENT
59F cardiac arrest with unknown downtime but at least 30' of ACLS before ROSC.  CT head with evidence of loss of grey-white matter differentiation and anoxia.

## 2019-12-27 NOTE — ED ADULT NURSE NOTE - OBJECTIVE STATEMENT
Patient BIBEMS s/p cardiac arrest, was found unresponsive by EMS, was intubated in field and ROSC obtained upon pulse check upon arrival. Pupils fixed and dilated. IO in place to left tibia. Patient BIBEMS s/p cardiac arrest, was found unresponsive by EMS, was intubated in field and ROSC was obtained by EMS. Upon pulse check found with pulse. Pupils fixed and dilated. IO in place to left tibia. Respiration maintained.

## 2019-12-27 NOTE — PROGRESS NOTE ADULT - SUBJECTIVE AND OBJECTIVE BOX
Patient is a 59y old  Female who presents with a chief complaint of cardiac arrest (27 Dec 2019 17:29)    24 hour events: Pt seen and examined at bedside. Chart/labs reviewed.   PAST MEDICAL & SURGICAL HISTORY:      Review of Systems:  Constitutional: No fever, chills, fatigue  Neuro: No headache, numbness, weakness  Resp: No cough, wheezing, shortness of breath  CVS: No chest pain, palpitations, leg swelling  GI: No abdominal pain, nausea, vomiting, diarrhea   : No dysuria, frequency, incontinence  Skin: No itching, burning, rashes, or lesions   Msk: No joint pain or swelling  Psych: No depression, anxiety, mood swings    T(F): 95.5 (19 @ 16:48), Max: 96.5 (19 @ 14:50)  HR: 88 (19 @ 19:00) (64 - 98)  BP: 132/74 (19 @ 19:00) (64/47 - 158/77)  RR: 18 (19 @ 19:00) (16 - 38)  SpO2: 96% (19 @ 19:00) (93% - 100%)  Wt(kg): --    Mode: AC/ CMV (Assist Control/ Continuous Mandatory Ventilation), RR (machine): 16, TV (machine): 400, FiO2: 40, PEEP: 5    CAPILLARY BLOOD GLUCOSE      POCT Blood Glucose.: 210 mg/dL (27 Dec 2019 14:31)      I&O's Summary    27 Dec 2019 07:01  -  27 Dec 2019 19:37  --------------------------------------------------------  IN: 375 mL / OUT: 0 mL / NET: 375 mL        Physical Exam:     Gen:   Neuro: A&Ox3, non-focal  HEENT: NC/AT  Resp: CTA b/l  CVS: nl S1/S2, RRR  Abd: soft, nt, nd, +bs  Ext: no edema, +pulses  Skin: well perfused, warm    Meds:      hydrocortisone sodium succinate Injectable IV Push    ALBUTerol    90 MICROgram(s) HFA Inhaler Inhalation    fentaNYL    Injectable IV Push PRN  midazolam Injectable IV Push PRN  propofol Infusion IV Continuous  valproate sodium IVPB IV Intermittent      heparin  Injectable SubCutaneous    pantoprazole  Injectable IV Push      lactated ringers. IV Continuous      chlorhexidine 0.12% Liquid Oral Mucosa                              11.2   10.58 )-----------( 282      ( 27 Dec 2019 14:55 )             36.1           144  |  105  |  10  ----------------------------<  172<H>  2.7<LL>   |  17<L>  |  1.42<H>    Ca    7.9<L>      27 Dec 2019 14:55  Mg     2.2         TPro  5.6<L>  /  Alb  2.6<L>  /  TBili  0.8  /  DBili  x   /  AST  463<H>  /  ALT  229<H>  /  AlkPhos  155<H>      Lactate 13.6            @ 14:59      CARDIAC MARKERS ( 27 Dec 2019 14:55 )  .394 ng/mL / x     / x     / x     / x          PT/INR - ( 27 Dec 2019 14:55 )   PT: 13.2 sec;   INR: 1.20 ratio         PTT - ( 27 Dec 2019 14:55 )  PTT:34.4 sec  Urinalysis Basic - ( 27 Dec 2019 14:59 )    Color: Yellow / Appearance: Clear / S.010 / pH: x  Gluc: x / Ketone: Negative  / Bili: Negative / Urobili: Negative mg/dL   Blood: x / Protein: Negative mg/dL / Nitrite: Negative   Leuk Esterase: Trace / RBC: x / WBC 3-5   Sq Epi: x / Non Sq Epi: Occasional / Bacteria: Occasional              CXR:    CTH:    CT Chest:    CT A/P:    TTE:        CENTRAL LINE: yes/no    NOBLE: yes/no    A-LINE: yes/no    GLOBAL ISSUE/BEST PRACTICE:  Analgesia:  Sedation:  HOB elevation: yes  Stress ulcer prophylaxis:  VTE prophylaxis:  Glycemic control:  Nutrition:      CODE STATUS: ***  [facility] discussion: Y  Critical care time spent (mins): ***  (Reviewing data, imaging, discussing with multidisciplinary team, non inclusive of procedures, discussing goals of care with patient/family) In Brief:  59F with PMHx of depression, opioid abuse (on methadone), ovarian ca, BIBA with Vfib arrest. Pt at home with ?seizure episode, and stopped breathing. EMS arrived and found Pt in Vfib arrest. Pt intubated in field, ACLS protocol intiated. Pt given epi x4, shock x2 with ROSC achieved. Pt went into cardiac arrest a second time en route to [place] ED and again ACLS initiated with ROSC achieved after 1st pulse check. Pt admitted to SPCU for post cardiac arrest care, vent management and TTM.      24 hour events: Pt seen and examined at bedside. Chart/labs reviewed. Pan-scanned. CTH with diffuse anoxic injury with generalized edema. CT chest/abd with aspiration pneumonitis and colonic ileus. Pt started on modified hypothermia protocol. Troponin 19, with diffuse ST depressions on EKG. Pt started on heparin gtt after consultation with Cardiology for ACS. NGT placed for oral medications and feeds. Hypokalemia and hypomagnesemia repleted.     PAST MEDICAL & SURGICAL HISTORY:      Review of Systems:  Unable to otbain    T(F): 95.5 (19 @ 16:48), Max: 96.5 (19 @ 14:50)  HR: 88 (19 @ 19:00) (64 - 98)  BP: 132/74 (19 @ 19:00) (64/47 - 158/77)  RR: 18 (19 @ 19:00) (16 - 38)  SpO2: 96% (19 @ 19:00) (93% - 100%)  Wt(kg): --    Mode: AC/ CMV (Assist Control/ Continuous Mandatory Ventilation), RR (machine): 16, TV (machine): 400, FiO2: 40, PEEP: 5    CAPILLARY BLOOD GLUCOSE      POCT Blood Glucose.: 210 mg/dL (27 Dec 2019 14:31)      I&O's Summary    27 Dec 2019 07:01  -  27 Dec 2019 19:37  --------------------------------------------------------  IN: 375 mL / OUT: 0 mL / NET: 375 mL        Physical Exam:     Gen: Unresponsive  Neuro: Intubated. Unresponsive to pain.   HEENT: Pupils reactive  Resp: CTA b/l  CVS: nl S1/S2, RRR  Abd: soft, nt, nd, +bs  Ext: no edema, +pulses  Skin: well perfused, warm      Meds:    hydrocortisone sodium succinate Injectable IV Push  ALBUTerol    90 MICROgram(s) HFA Inhaler Inhalation  fentaNYL    Injectable IV Push PRN  midazolam Injectable IV Push PRN  propofol Infusion IV Continuous  valproate sodium IVPB IV Intermittent  heparin  Injectable SubCutaneous  pantoprazole  Injectable IV Push  lactated ringers. IV Continuous  chlorhexidine 0.12% Liquid Oral Mucosa                              11.2   10.58 )-----------( 282      ( 27 Dec 2019 14:55 )             36.1           144  |  105  |  10  ----------------------------<  172<H>  2.7<LL>   |  17<L>  |  1.42<H>    Ca    7.9<L>      27 Dec 2019 14:55  Mg     2.2         TPro  5.6<L>  /  Alb  2.6<L>  /  TBili  0.8  /  DBili  x   /  AST  463<H>  /  ALT  229<H>  /  AlkPhos  155<H>      Lactate 13.6            @ 14:59      CARDIAC MARKERS ( 27 Dec 2019 14:55 )  .394 ng/mL / x     / x     / x     / x          PT/INR - ( 27 Dec 2019 14:55 )   PT: 13.2 sec;   INR: 1.20 ratio         PTT - ( 27 Dec 2019 14:55 )  PTT:34.4 sec  Urinalysis Basic - ( 27 Dec 2019 14:59 )    Color: Yellow / Appearance: Clear / S.010 / pH: x  Gluc: x / Ketone: Negative  / Bili: Negative / Urobili: Negative mg/dL   Blood: x / Protein: Negative mg/dL / Nitrite: Negative   Leuk Esterase: Trace / RBC: x / WBC 3-5   Sq Epi: x / Non Sq Epi: Occasional / Bacteria: Occasional              CXR:  pending report    CTH:  PROCEDURE DATE: 2019         INTERPRETATION: INDICATION: Altered mental status. Status post cardiac   arrest.   TECHNIQUE: A non contrast 2.5mm axial CT study of the brain was performed   from skull base to vertex. Coronal and sagittal reformations were generated   from the axial data.   COMPARISON EXAMINATION: No prior     FINDINGS:     There is a diffuse anoxic changes to the brain. There is generalized loss of   gray-white differentiation throughout both hemispheres with brain swelling.   There is gyral and sulcal effacement with diminution in size of the   cisternal and extracerebral spaces. Ventricles are midline.     Similar anoxic changes are also suggested in the brainstem and cerebellum   with decreasing extracerebral spaces and with loss of gray-white   differentiation.     There is no obvious hematoma.     The patient is intubated.     IMPRESSION:   1) diffuse anoxic changes to the brain with generalized cerebral edema and   loss of gray-white differentiation. Follow-up MR imaging may be considered   for further assessment, along with follow-up CT imaging.   2) no obvious hemorrhage.     Dr. Hartman was notified at 3:10 PM.     CT Chest:  PROCEDURE DATE: 2019         INTERPRETATION: CLINICAL INFORMATION: Cardiac arrest     COMPARISON: None.     PROCEDURE:   CT of the Chest, Abdomen and Pelvis was performed without intravenous   contrast.   Intravenous contrast: None.   Oral contrast: None.   Sagittal and coronal reformats were performed.     FINDINGS:     CHEST:     LUNGS AND LARGE AIRWAYS: Patent central airways. Endotracheal tube in   position. Multifocal bilateral airspace and groundglass infiltrates in the   lingula segment of left upper lobe left lower lobe and the right upper lobe   presumptive multifocal pneumonia in appropriate clinical setting. Element of   aspiration could be present. Dependent atelectasis left lower lobe.   PLEURA: No pleural effusion.   VESSELS: Nonaneurysmal   HEART: Heart size is normal. No pericardial effusion.   MEDIASTINUM AND BRYON: Scattered nonspecific subcentimeter mediastinal lymph   nodes. Lack of IV contrast limits hilar evaluation   CHEST WALL AND LOWER NECK: Within normal limits.     ABDOMEN AND PELVIS:     LIVER: Within normal limits.   BILE DUCTS: Normal caliber.   GALLBLADDER: Within normal limits.   SPLEEN: Within normal limits.   PANCREAS: Within normal limits.   ADRENALS: Within normal limits.   KIDNEYS/URETERS: Within normal limits.     BLADDER: Partially decompressed with Noble   REPRODUCTIVE ORGANS: Calcified uterine fibroid     BOWEL: No bowel obstruction. Moderate colonic ileus.. Appendix no evidence   of appendicitis   PERITONEUM: No ascites.   VESSELS: Nonaneurysmal.   RETROPERITONEUM/LYMPH NODES: No lymphadenopathy.   ABDOMINAL WALL: Within normal limits.   BONES: Within normal limits.     IMPRESSION:     Multifocal bilateral infiltrates as described consistent with pneumonia in   the appropriate clinical setting. An element of aspiration is considered.   No specific acute pathology abdomen and pelvis.   Moderate colonic ileus.     LE Venous US:  ******PRELIMINARY REPORT******   ******PRELIMINARY REPORT******     EXAM: US DPLX LWR EXT VEINS COMPL BI             PROCEDURE DATE: 2019   ******PRELIMINARY REPORT******   ******PRELIMINARY REPORT******         INTERPRETATION: [facility] RADIOLOGIST PRELIMINARY REPORT     PROCEDURE INFORMATION:   Exam: US Duplex Lower Extremity Veins   Exam date and time: 2019 9:24 PM   Age: 59 years old   Clinical indication: Other: Cardiac arrest     TECHNIQUE:   Imaging protocol: Real-time duplex ultrasound of the Lower Extremities with   2-D   gray scale, color Doppler flow and spectral waveform analysis with image   documentation. Complete exam focused on the bilateral lower extremity veins.     COMPARISON:   No relevant prior studies available.     FINDINGS:   Right deep veins: Unremarkable. The common femoral, femoral, proximal   profunda   femoral and popliteal veins are patent without thrombus. Normal Doppler   waveforms. Normal compressibility and/or augmentation response.   Right superficial veins: Saphenofemoral junction is patent without thrombus.     Left deep veins: Unremarkable. The common femoral, femoral, proximal   profunda   femoral and popliteal veins are patent without thrombus. Normal Doppler   waveforms. Normal compressibility and/or augmentation response.   Left superficial veins: Saphenofemoral junction is patent without thrombus.     Soft tissues: Unremarkable.     IMPRESSION:   No acute findings. No evidence of deep vein thrombosis.             ******PRELIMINARY REPORT******   ******PRELIMINARY REPORT******         RAY GORDON M.D.;[facility] RADIOLOGIST       TTE:  Pending report      CENTRAL LINE: no    NOBLE: yes    A-LINE: yes    GLOBAL ISSUE/BEST PRACTICE:  Analgesia: yes  Sedation: yes  HOB elevation: yes  Stress ulcer prophylaxis: yes  VTE prophylaxis: yes  Glycemic control: yes  Nutrition: npo      CODE STATUS: Full  GOC discussion: Y  Critical care time spent (mins): 57  (Reviewing data, imaging, discussing with multidisciplinary team, non inclusive of procedures, discussing goals of care with patient/family)

## 2019-12-27 NOTE — CONSULT NOTE ADULT - ASSESSMENT
The patient is a 59 year old female with unknown past medical history who presents with cardiac arrest.    Plan:  - Unclear etiology of arrest  - ECG with diffuse ST depressions but no elevations  - Troponin elevated at 0.3. Continue to trend.  - CT head with no bleed, but anoxic brain injury present  - CT chest with multifocal PNA likely aspiration  - Ventricular ectopy on telemetry. If patient having runs of VT, would start amiodarone bolus and drip  - Echocardiogram with grossly normal LV systolic function  - BP currently acceptable. If goes into shock, start norepinephrine drip.  - Hypothermia protocol started  - Mechanical ventilation  - Neurology evaluation  - Overall prognosis poor The patient is a 59 year old female with unknown past medical history who presents with cardiac arrest.    Plan:  - Unclear etiology of arrest  - ECG with diffuse ST depressions but no elevations  - Troponin elevated at 0.3. Continue to trend.  - CT head with no bleed, but anoxic brain injury present  - CT chest with multifocal PNA likely aspiration  - Ventricular ectopy on telemetry. If patient having runs of VT, would start amiodarone bolus and drip  - Echocardiogram with grossly normal LV systolic function  - BP currently acceptable. If goes into shock, start norepinephrine drip.  - Hypothermia protocol started  - Mechanical ventilation  - Neurology evaluation  - Overall prognosis poor    ADDENDUM:  - Second troponin elevated to 19. Decision made to start heparin drip to treat possible NSTEMI.  - Urine tox noted - positive for meth, opiates, and methadone. Suspect underlying arrest due to overdose and aspiration.

## 2019-12-27 NOTE — PROGRESS NOTE ADULT - ASSESSMENT
59F with PMHx as above, admitted to SPCU s/p Vfib arrest, acute hypoxemic and hypercapnic respiratory failure, likely anoxic injury, LORNE with hypokalemia and hypomagnesemia, severe metabolic aciodosis, possible aspiration pneumonitis, ACS    -Hypothermia protocol initiated with temp goal 36C. Continue propofol gtt as per hypothermia protocol. Q1 hour neurochecks  -Versed/fentanyl prn   -CTH with diffuse anoxic injury with cerebral edema. Neurology recommendations appreciated. Depcaon started. Monitor for further episodes of seizure activity. Consider EEG  -Monitor HD's. Keep MAP > 70. Start pressor support if hypotensive. Pt with bigeminy, K 2.7, mg 1.6 both repleted. Troponin now 19, with diffuse ST depressions on EKG. Pt started on heparin gtt for ACS. TTE obtained, F/U report  -Full Lung protective ventilation. FiO2 currently at 40%, RR 16, peep 5, . Pt unstable for SBT. Check abg in am  -CT chest with evidence of aspiration pneumonitis. Pt started on zosyn epmirically.  -NGT placed. Colonic Ileus on CT abdomen. Will hold off TF's for now. PPI  -Acute liver failure due to cardiac arrest. Trend LFT's  -LORNE in setting of cardiac arrest. Monitor UOP/Lytes. Aggressive repletion of electrolytes to keep K+ > 4, Mg > 2, Phos > 3. Avoid nephrotoxic agents  -Lactic acidosis improving. Continue to trend.   -Continue zosyn. Monitor wbc/temp. F/U cultures  -DVT ppx  -Pt remains critically ill. Had extensive conversation with  at bedside regarding Pt's current clinical condition, tx plan and prognosis. All questions answered. Pt remains full code

## 2019-12-27 NOTE — PROGRESS NOTE ADULT - SUBJECTIVE AND OBJECTIVE BOX
HPI: 58 y/o F w/ pmh of opioid abuse, depression, ovarian ca, presented to [facility] earlier today in cardiac arrest. Pt unable to provide hx, no family present. Per EMS pt was reported having been talking to friends when she suddenly had a seizure and then stopped breathing, per EMS friends immediately called 911 and EMS found pt in vfib. ACLS protocol was started pt received x4 epi and 2 shock and ROSC was achieve while in route to the ED pt arrest again and came into the trauma bay in cardiac arrest. ROSC was achieved again during 1st pulse checked. During my evaluation in the ED pt was bradycardic 40-60s, SBP 60 for which she was given 3L of wide open w/ some improvement in fluids. Pt underwent PAN-SCAN to r/o any acute brain/cervical/chest/abd/pelvis pathology and admitted to the ICU for further tx and evaluation. No further information available at this time.      Review of Systems: Unable to obtain ROS.    T(F): 96.5 (19 @ 14:50), Max: 96.5 (19 @ 14:50)  HR: 80 (19 @ 16:06) (64 - 85)  BP: 133/67 (19 @ 15:37) (64/47 - 133/67)  RR: 19 (19 @ 15:37) (16 - 19)  SpO2: 99% (19 @ 16:06) (99% - 100%)  Wt(kg): --    Mode: AC/ CMV (Assist Control/ Continuous Mandatory Ventilation), RR (machine): 16, TV (machine): 400, FiO2: 100, PEEP: 5    CAPILLARY BLOOD GLUCOSE      POCT Blood Glucose.: 210 mg/dL (27 Dec 2019 14:31)      I&O's Summary      Physical Exam:   Gen: intubated and non-responsive not on any sedation  Neuro: obtunded, no gag reflux noted, no responds to verbal/tactile or painful stimuli   HEENT: pupils fixed and dilated, +R. EJ line placed  Resp: Good air entry b/l  CVS: +RRR  Abd: BSx4, soft, nt/nd  Ext: no edema, +L. IO   Skin: warm/dry    Meds:  piperacillin/tazobactam IVPB. IV Intermittent        ALBUTerol    90 MICROgram(s) HFA Inhaler Inhalation    aspirin Suppository Rectal  fentaNYL    Injectable IV Push PRN  midazolam Injectable IV Push PRN        pantoprazole  Injectable IV Push      lactated ringers. IV Continuous  potassium chloride  10 mEq/100 mL IVPB IV Intermittent      chlorhexidine 0.12% Liquid Oral Mucosa                              11.2   10.58 )-----------( 282      ( 27 Dec 2019 14:55 )             36.1           144  |  105  |  10  ----------------------------<  172<H>  2.7<LL>   |  17<L>  |  1.42<H>    Ca    7.9<L>      27 Dec 2019 14:55  Mg     2.2         TPro  5.6<L>  /  Alb  2.6<L>  /  TBili  0.8  /  DBili  x   /  AST  463<H>  /  ALT  229<H>  /  AlkPhos  155<H>      Lactate 13.6            @ 14:59      CARDIAC MARKERS ( 27 Dec 2019 14:55 )  .394 ng/mL / x     / x     / x     / x          PT/INR - ( 27 Dec 2019 14:55 )   PT: 13.2 sec;   INR: 1.20 ratio         PTT - ( 27 Dec 2019 14:55 )  PTT:34.4 sec  Urinalysis Basic - ( 27 Dec 2019 14:59 )    Color: Yellow / Appearance: Clear / S.010 / pH: x  Gluc: x / Ketone: Negative  / Bili: Negative / Urobili: Negative mg/dL   Blood: x / Protein: Negative mg/dL / Nitrite: Negative   Leuk Esterase: Trace / RBC: x / WBC 3-5   Sq Epi: x / Non Sq Epi: Occasional / Bacteria: Occasional        Radiology:      EXAM:  CT BRAIN                                  PROCEDURE DATE:  2019          INTERPRETATION:  INDICATION:  Altered mental status. Status post cardiac arrest.  TECHNIQUE:  A non contrast 2.5mm axial CT study of the brain was performed from skull base to vertex. Coronal and sagittal reformations were generated from the axial data.  COMPARISON EXAMINATION:  No prior    FINDINGS:      There is a diffuse anoxic changes to the brain. There is generalized loss of gray-white differentiation throughout both hemispheres with brain swelling. There is gyral and sulcal effacement with diminution in size of the cisternal and extracerebral spaces. Ventricles are midline.    Similar anoxic changes are also suggested in the brainstem and cerebellumwith decreasing extracerebral spaces and with loss of gray-white differentiation.    There is no obvious hematoma.    The patient is intubated.    IMPRESSION:    1)  diffuse anoxic changes to the brain with generalized cerebral edema and loss of gray-white differentiation. Follow-up MR imaging may be considered for further assessment, along with follow-up CT imaging.  2)  no obvious hemorrhage.    Dr. Hartman was notified at 3:10 PM.    < from: CT Cervical Spine No Cont (.19 @ 15:28) >  EXAM:  CT CERVICAL SPINE                                  PROCEDURE DATE:  2019          INTERPRETATION:  INDICATIONS:  neck pain. Trauma.    TECHNIQUE:  Thin section CT imaging was conducted.  3-D, Coronal and sagittal reformations were generated from the axial data.      FINDINGS:      There is alteration of the cervical lordosis which may reflect positioning or spasm.     No acute fracture or destructive lesion is noted.    There is a subluxation with anterolisthesis at C4-5 and C7-T1.Chronic degenerative changes are noted from C3 to T1 with multilevel ridge disc complexes.    ET tube is noted within the trachea. The distal tip is not included on the study.      IMPRESSION:    No acute fracture identified. Multilevel degenerative changes.          MAYA BUTLER M.D., ATTENDING RADIOLOGIST  This document has been electronically signed. Dec 27 2019  3:18PM    < end of copied text >      Bedside ultrasound:     CENTRAL LINE: N  NOBLE: Y  A-LINE: Y    GLOBAL ISSUE/BEST PRACTICE:  Analgesia: Y  Sedation: N  CAM-ICU: n/a  HOB elevation: Y  Stress ulcer prophylaxis: Y  VTE prophylaxis: N  Glycemic control: N  Nutrition: N (NPO)    CODE STATUS: FULL CODE HPI: 58 y/o F w/ pmh of opioid abuse, depression, ovarian ca, presented to [facility] earlier today in cardiac arrest. Pt unable to provide hx, no family present. Per EMS pt was reported having been talking to friends when she suddenly had a seizure and then stopped breathing, per EMS friends immediately called 911 and EMS found pt in vfib. ACLS protocol was started pt received x4 epi and 2 shock and ROSC was achieve while in route to the ED pt arrest again and came into the trauma bay in cardiac arrest. ROSC was achieved again during 1st pulse checked. During my evaluation in the ED pt was bradycardic 40-60s, SBP 60 for which she was given 3L of wide open w/ some improvement in fluids. Pt underwent PAN-SCAN to r/o any acute brain/cervical/chest/abd/pelvis pathology and admitted to the ICU for further tx and evaluation. No further information available at this time.      Review of Systems: Unable to obtain ROS.    T(F): 96.5 (19 @ 14:50), Max: 96.5 (19 @ 14:50)  HR: 80 (19 @ 16:06) (64 - 85)  BP: 133/67 (19 @ 15:37) (64/47 - 133/67)  RR: 19 (19 @ 15:37) (16 - 19)  SpO2: 99% (19 @ 16:06) (99% - 100%)  Wt(kg): --    Mode: AC/ CMV (Assist Control/ Continuous Mandatory Ventilation), RR (machine): 16, TV (machine): 400, FiO2: 100, PEEP: 5    CAPILLARY BLOOD GLUCOSE      POCT Blood Glucose.: 210 mg/dL (27 Dec 2019 14:31)      I&O's Summary      Physical Exam:   Gen: intubated and non-responsive not on any sedation  Neuro: obtunded, no gag reflux noted, no responds to verbal/tactile or painful stimuli   HEENT: pupils 2.5mm non-reactive to light, +R. EJ line placed  Resp: Good air entry b/l  CVS: +RRR  Abd: BSx4, soft, nt/nd  Ext: no edema, +L. IO   Skin: warm/dry    Meds:  piperacillin/tazobactam IVPB. IV Intermittent        ALBUTerol    90 MICROgram(s) HFA Inhaler Inhalation    aspirin Suppository Rectal  fentaNYL    Injectable IV Push PRN  midazolam Injectable IV Push PRN        pantoprazole  Injectable IV Push      lactated ringers. IV Continuous  potassium chloride  10 mEq/100 mL IVPB IV Intermittent      chlorhexidine 0.12% Liquid Oral Mucosa                              11.2   10.58 )-----------( 282      ( 27 Dec 2019 14:55 )             36.1           144  |  105  |  10  ----------------------------<  172<H>  2.7<LL>   |  17<L>  |  1.42<H>    Ca    7.9<L>      27 Dec 2019 14:55  Mg     2.2         TPro  5.6<L>  /  Alb  2.6<L>  /  TBili  0.8  /  DBili  x   /  AST  463<H>  /  ALT  229<H>  /  AlkPhos  155<H>      Lactate 13.6            @ 14:59      CARDIAC MARKERS ( 27 Dec 2019 14:55 )  .394 ng/mL / x     / x     / x     / x          PT/INR - ( 27 Dec 2019 14:55 )   PT: 13.2 sec;   INR: 1.20 ratio         PTT - ( 27 Dec 2019 14:55 )  PTT:34.4 sec  Urinalysis Basic - ( 27 Dec 2019 14:59 )    Color: Yellow / Appearance: Clear / S.010 / pH: x  Gluc: x / Ketone: Negative  / Bili: Negative / Urobili: Negative mg/dL   Blood: x / Protein: Negative mg/dL / Nitrite: Negative   Leuk Esterase: Trace / RBC: x / WBC 3-5   Sq Epi: x / Non Sq Epi: Occasional / Bacteria: Occasional        Radiology:      EXAM:  CT BRAIN                                  PROCEDURE DATE:  2019          INTERPRETATION:  INDICATION:  Altered mental status. Status post cardiac arrest.  TECHNIQUE:  A non contrast 2.5mm axial CT study of the brain was performed from skull base to vertex. Coronal and sagittal reformations were generated from the axial data.  COMPARISON EXAMINATION:  No prior    FINDINGS:      There is a diffuse anoxic changes to the brain. There is generalized loss of gray-white differentiation throughout both hemispheres with brain swelling. There is gyral and sulcal effacement with diminution in size of the cisternal and extracerebral spaces. Ventricles are midline.    Similar anoxic changes are also suggested in the brainstem and cerebellumwith decreasing extracerebral spaces and with loss of gray-white differentiation.    There is no obvious hematoma.    The patient is intubated.    IMPRESSION:    1)  diffuse anoxic changes to the brain with generalized cerebral edema and loss of gray-white differentiation. Follow-up MR imaging may be considered for further assessment, along with follow-up CT imaging.  2)  no obvious hemorrhage.    Dr. Hartman was notified at 3:10 PM.    < from: CT Cervical Spine No Cont (19 @ 15:28) >  EXAM:  CT CERVICAL SPINE                                  PROCEDURE DATE:  2019          INTERPRETATION:  INDICATIONS:  neck pain. Trauma.    TECHNIQUE:  Thin section CT imaging was conducted.  3-D, Coronal and sagittal reformations were generated from the axial data.      FINDINGS:      There is alteration of the cervical lordosis which may reflect positioning or spasm.     No acute fracture or destructive lesion is noted.    There is a subluxation with anterolisthesis at C4-5 and C7-T1.Chronic degenerative changes are noted from C3 to T1 with multilevel ridge disc complexes.    ET tube is noted within the trachea. The distal tip is not included on the study.      IMPRESSION:    No acute fracture identified. Multilevel degenerative changes.          MAYA BUTLER M.D., ATTENDING RADIOLOGIST  This document has been electronically signed. Dec 27 2019  3:18PM    < end of copied text >      Bedside ultrasound:     CENTRAL LINE: N  NOBLE: Y  A-LINE: Y    GLOBAL ISSUE/BEST PRACTICE:  Analgesia: Y  Sedation: N  CAM-ICU: n/a  HOB elevation: Y  Stress ulcer prophylaxis: Y  VTE prophylaxis: N  Glycemic control: N  Nutrition: N (NPO)    CODE STATUS: FULL CODE

## 2019-12-27 NOTE — H&P ADULT - NSHPLABSRESULTS_GEN_ALL_CORE
11.2   1058 )-----------( 282      ( 27 Dec 2019 14:55 )             36.1           144  |  105  |  10  ----------------------------<  172<H>  2.7<LL>   |  17<L>  |  1.42<H>    Ca    7.9<L>      27 Dec 2019 14:55  Mg     2.2         TPro  5.6<L>  /  Alb  2.6<L>  /  TBili  0.8  /  DBili  x   /  AST  463<H>  /  ALT  229<H>  /  AlkPhos  155<H>      Lactate 13.6            @ 14:59      CARDIAC MARKERS ( 27 Dec 2019 14:55 )  .394 ng/mL / x     / x     / x     / x          PT/INR - ( 27 Dec 2019 14:55 )   PT: 13.2 sec;   INR: 1.20 ratio         PTT - ( 27 Dec 2019 14:55 )  PTT:34.4 sec  Urinalysis Basic - ( 27 Dec 2019 14:59 )    Color: Yellow / Appearance: Clear / S.010 / pH: x  Gluc: x / Ketone: Negative  / Bili: Negative / Urobili: Negative mg/dL   Blood: x / Protein: Negative mg/dL / Nitrite: Negative   Leuk Esterase: Trace / RBC: x / WBC 3-5   Sq Epi: x / Non Sq Epi: Occasional / Bacteria: Occasional      Radiology:    EXAM:  CT BRAIN                          PROCEDURE DATE:  2019      INTERPRETATION:  INDICATION:  Altered mental status. Status post cardiac arrest.  TECHNIQUE:  A non contrast 2.5mm axial CT study of the brain was performed from skull base to vertex. Coronal and sagittal reformations were generated from the axial data.  COMPARISON EXAMINATION:  No prior    FINDINGS:      There is a diffuse anoxic changes to the brain. There is generalized loss of gray-white differentiation throughout both hemispheres with brain swelling. There is gyral and sulcal effacement with diminution in size of the cisternal and extracerebral spaces. Ventricles are midline.    Similar anoxic changes are also suggested in the brainstem and cerebellumwith decreasing extracerebral spaces and with loss of gray-white differentiation.    There is no obvious hematoma.    The patient is intubated.    IMPRESSION:    1)  diffuse anoxic changes to the brain with generalized cerebral edema and loss of gray-white differentiation. Follow-up MR imaging may be considered for further assessment, along with follow-up CT imaging.  2)  no obvious hemorrhage.    Dr. Hartman was notified at 3:10 PM.    < from: CT Cervical Spine No Cont (19 @ 15:28) >  EXAM:  CT CERVICAL SPINE                          PROCEDURE DATE:  2019      INTERPRETATION:  INDICATIONS:  neck pain. Trauma.    TECHNIQUE:  Thin section CT imaging was conducted.  3-D, Coronal and sagittal reformations were generated from the axial data.      FINDINGS:      There is alteration of the cervical lordosis which may reflect positioning or spasm.     No acute fracture or destructive lesion is noted.    There is a subluxation with anterolisthesis at C4-5 and C7-T1.Chronic degenerative changes are noted from C3 to T1 with multilevel ridge disc complexes.    ET tube is noted within the trachea. The distal tip is not included on the study.    IMPRESSION:    No acute fracture identified. Multilevel degenerative changes.    MAYA BUTLER M.D., ATTENDING RADIOLOGIST  This document has been electronically signed. Dec 27 2019  3:18PM

## 2019-12-27 NOTE — CONSULT NOTE ADULT - ATTENDING COMMENTS
35 minutes of critical care time spent with the patient and coordinating care with nursing, hospitalist, and consultants. Patient is critically ill requiring ICU care due to cardiac arrest, respiratory failure, shock. The patient is high risk for deterioration and death.

## 2019-12-27 NOTE — H&P ADULT - ASSESSMENT
60 y/o F w/ pmh of opioid abuse, depression, ovarian ca, presented to [facility] earlier today in cardiac arrest. Pt unable to provide hx, no family present. Per EMS pt was reported having been talking to friends when she suddenly had a seizure and then stopped breathing, per EMS friends immediately called 911 and EMS found pt in vfib. ACLS protocol was started pt received x4 epi and 2 shock and ROSC was achieve while in route to the ED pt arrest again and came into the trauma bay in cardiac arrest. ROSC was achieved again during 1st pulse checked. Pt now admitted to the SPCU for cardiac arrest.    -Neuro:  -Cardiac: s/p cardiac arrest, will start pt on hypothermia protocol, maintain core temp at 36, maintain MAP >90,   -Resp: Cont lung protective ventilation, titrate fio2 down to 21% if possible and maintain o2 >92% 58 y/o F w/ pmh of opioid abuse, depression, ovarian ca, presented to [facility] earlier today in cardiac arrest. Pt unable to provide hx, no family present. Per EMS pt was reported having been talking to friends when she suddenly had a seizure and then stopped breathing, per EMS friends immediately called 911 and EMS found pt in vfib. ACLS protocol was started pt received x4 epi and 2 shock and ROSC was achieve while in route to the ED pt arrest again and came into the trauma bay in cardiac arrest. ROSC was achieved again during 1st pulse checked. Pt now admitted to the SPCU for cardiac arrest.    -Neuro:  -Cardiac: s/p cardiac arrest, will start pt on hypothermia protocol, maintain core temp at 36, maintain MAP >90,   -Resp: Cont lung protective ventilation, titrate fio2 down to 21% if possible and maintain o2 >92%      Addendum: (Dr. Haque)    Cardiac Arrest   Etiology unknown; Need to evaluate UA Drug Screen; Suspect Methamphetiamines   Follow up on Echocardiogram and Cardiolgy consulted   Continue TTM and MAP >90  Pressors PRN; Intubated and Sedated with Propofol   CT Head reveals early anoxic brain injury   Will need to have GOC conversation with family when they come in and also for more history   CC: Dr. Ramírez    Acute Respiratory Failure   Possible Aspiration; IV Zosyn + IV Vancomycin; Nebulizers PRN   Repeat CXR in AM; Inutabted and Sedated   Follow up cultures     Acute Renal Failure  Baseline Creatinine unknown   From Cardiac Arrest  Renally dose all medications and avoid Nephrotoxic agents   Monitor BMP and electrolytes     Transaminitis   Most likely from shock with possible underlying liver disease   Will maintain MAP and trend for now    Diet  NPO    DVT Prophylaxis  Heparin SC TID     Disposition  Full Code/Inpatient  Discharge planning pending hospital course 58 y/o F w/ pmh of opioid abuse, depression, ovarian ca, presented to [facility] earlier today in cardiac arrest. Pt unable to provide hx, no family present. Per EMS pt was reported having been talking to friends when she suddenly had a seizure and then stopped breathing, per EMS friends immediately called 911 and EMS found pt in vfib. ACLS protocol was started pt received x4 epi and 2 shock and ROSC was achieve while in route to the ED pt arrest again and came into the trauma bay in cardiac arrest. ROSC was achieved again during 1st pulse checked. Pt now admitted to the SPCU for cardiac arrest.    -Neuro: Diffuse anxious brain injury noted on CTH, otherwise PAN-SCAN w/ no acute finding, neurology consult input noted, pt statted on depacon, possible EEG?, monitor mental status closely  -Cardiac: s/p cardiac arrest, will start pt on hypothermia protocol, maintain core temp at 36, maintain MAP >90,  -Resp: Cont lung protective ventilation, titrate fio2 down to 21% if possible and maintain o2 >92%, pt currently overbreathing the went, will start on propofol for vent synchrony and recheck ABG  -GI: NPO for now, GI ppx w/ protonix   -Renal: LORNE likely 2/2 to ATN, cont to monitor renal function and lyntes closely, replete w/ renal precautions, monitor I&O closely  -ID: Possible asp event will cover w/ IV zosyn for now, monitor Wbc/temp for now, LA at 13? likely in the setting of cardiac arrest will f/u on urine/blod cx and procalcitonin levels  -Endo: No acute process, monitor MS closely given critical illness  -Heme: DVT ppx w/ heparin for now  -Dispo: Admit to ICU, pt w/ critical illness, giving CTH finding prognosis is poor, there is a possibility the event could have been drug OD amphetamine, case d/w ER attending, cardiology, neurology, hospitalize dr. ramírez and eICU    Addendum: (Dr. Haque)    Cardiac Arrest   Etiology unknown; Need to evaluate UA Drug Screen; Suspect Methamphetiamines   Follow up on Echocardiogram and Cardiolgy consulted   Continue TTM and MAP >90  Pressors PRN; Intubated and Sedated with Propofol   CT Head reveals early anoxic brain injury   Will need to have GOC conversation with family when they come in and also for more history   CC: Dr. Ramírez    Acute Respiratory Failure   Possible Aspiration; IV Zosyn + IV Vancomycin; Nebulizers PRN   Repeat CXR in AM; Inutabted and Sedated   Follow up cultures     Acute Renal Failure  Baseline Creatinine unknown   From Cardiac Arrest  Renally dose all medications and avoid Nephrotoxic agents   Monitor BMP and electrolytes     Transaminitis   Most likely from shock with possible underlying liver disease   Will maintain MAP and trend for now    Diet  NPO    DVT Prophylaxis  Heparin SC TID     Disposition  Full Code/Inpatient  Discharge planning pending hospital course

## 2019-12-27 NOTE — PROCEDURE NOTE - NSINDICATIONS_GEN_A_CORE
feeds
critical patient/arterial puncture to obtain ABG's
emergency venous access
monitoring purposes/arterial puncture to obtain ABG's/critical patient/blood sampling

## 2019-12-27 NOTE — PROVIDER CONTACT NOTE (EICU) - RECOMMENDATIONS
Incomplete neurological recovery at present, so candidate for targeted temperature management       * target 36C for at least 24 hours       * maintain mag >2       * check head CT; get EEG if any evidence of seizure-like activity       * Precedex or propofol for sedation  - stress-dose steroids with hydrocortisone 50mg IV Q6h  - Maintain MAP >70  - Maintain spO2 92-94%, avoid hyperoxia  - Maintain pCO2 ~40mmHg  - -180

## 2019-12-28 NOTE — PROGRESS NOTE ADULT - SUBJECTIVE AND OBJECTIVE BOX
Neurology Follow up note    KESHIA HANEY 59y Female    HPI: 58 y/o F w/ pmh of opioid abuse, depression, ovarian ca, presented to [facility] earlier today in cardiac arrest. Pt unable to provide hx, no family present. Per EMS pt was reported having been talking to friends when she suddenly had a seizure and then stopped breathing, per EMS friends immediately called 911 and EMS found pt in vfib. ACLS protocol was started pt received x4 epi and 2 shock and ROSC was achieve while in route to the ED pt arrest again and came into the trauma bay in cardiac arrest. ROSC was achieved again during 1st pulse checked. During my evaluation in the ED pt was bradycardic 40-60s, SBP 60 for which she was given 3L of wide open w/ some improvement in fluids. Pt underwent PAN-SCAN to r/o any acute brain/cervical/chest/abd/pelvis pathology and admitted to the ICU for further tx and evaluation. No further information available at this time. (27 Dec 2019 17:29)  pH was 7.09  Positive Troponin.  CT Head showed signs of anoxic Brain Injury.  Pt is intubated on vent.    Interval History -    Patient is seen, chart was reviewed and case was discussed with the treatment team.  Sister is at bedside.  On Propofol.    Vital Signs Last 24 Hrs  T(C): 34.4 (28 Dec 2019 10:00), Max: 36.6 (27 Dec 2019 20:00)  T(F): 93.9 (28 Dec 2019 10:00), Max: 97.9 (27 Dec 2019 20:00)  HR: 56 (28 Dec 2019 12:00) (56 - 98)  BP: 140/101 (28 Dec 2019 12:00) (64/47 - 165/82)  BP(mean): 111 (28 Dec 2019 12:00) (83 - 117)  RR: 16 (28 Dec 2019 12:00) (16 - 38)  SpO2: 99% (28 Dec 2019 12:00) (93% - 100%)    Height (cm): 172.72 ( @ 14:23)  Weight (kg): 65 ( @ 16:06)  BMI (kg/m2): 21.8 ( @ 16:06)    MEDICATIONS    ALBUTerol    90 MICROgram(s) HFA Inhaler 2 Puff(s) Inhalation every 6 hours  chlorhexidine 0.12% Liquid 15 milliLiter(s) Oral Mucosa every 12 hours  dextrose 40% Gel 15 Gram(s) Oral once PRN  dextrose 5%. 1000 milliLiter(s) IV Continuous <Continuous>  dextrose 50% Injectable 12.5 Gram(s) IV Push once  dextrose 50% Injectable 25 Gram(s) IV Push once  dextrose 50% Injectable 25 Gram(s) IV Push once  fentaNYL    Injectable 25 MICROGram(s) IV Push every 1 hour PRN  glucagon  Injectable 1 milliGRAM(s) IntraMuscular once PRN  heparin  Infusion.  Unit(s)/Hr IV Continuous <Continuous>  heparin  Injectable 3800 Unit(s) IV Push every 6 hours PRN  insulin lispro (HumaLOG) corrective regimen sliding scale   SubCutaneous every 2 hours  lactated ringers. 1000 milliLiter(s) IV Continuous <Continuous>  midazolam Injectable 2 milliGRAM(s) IV Push every 30 minutes PRN  pantoprazole  Injectable 40 milliGRAM(s) IV Push daily  piperacillin/tazobactam IVPB.. 3.375 Gram(s) IV Intermittent every 8 hours  propofol Infusion 5 MICROgram(s)/kG/Min IV Continuous <Continuous>  valproate sodium IVPB 500 milliGRAM(s) IV Intermittent two times a day    Allergies    No Known Allergies    Neurological Exam  -    Intubated on vent.  On Propofol.  Not on pressors.  No response to pain is seen. Pain inflicted at b/l Ear lobes.  Pupil 2.5 mm - Not reacting to light.  B/L Corneal Reflexes  -are absent.  Doll's eyes  - Absent  Gag reflex   -absent.  No cough response is seen.  Breathing over the vent. Set rate is 16, breathing at 17  Neck is supple.  No myoclonic jerks or seizure activity is seen.    LABS:    CBC Full  -  ( 28 Dec 2019 12:03 )  WBC Count : 13.67 K/uL  RBC Count : 3.76 M/uL  Hemoglobin : 11.8 g/dL  Hematocrit : 36.3 %  Platelet Count - Automated : 261 K/uL  Mean Cell Volume : 96.5 fl  Mean Cell Hemoglobin : 31.4 pg  Mean Cell Hemoglobin Concentration : 32.5 gm/dL    Urinalysis Basic - ( 27 Dec 2019 14:59 )    Color: Yellow / Appearance: Clear / S.010 / pH: x  Gluc: x / Ketone: Negative  / Bili: Negative / Urobili: Negative mg/dL   Blood: x / Protein: Negative mg/dL / Nitrite: Negative   Leuk Esterase: Trace / RBC: x / WBC 3-5   Sq Epi: x / Non Sq Epi: Occasional / Bacteria: Occasional        141  |  108  |  18  ----------------------------<  131<H>  4.4   |  23  |  0.93    Ca    7.8<L>      28 Dec 2019 12:03  Phos  4.5       Mg     2.7         TPro  5.9<L>  /  Alb  2.6<L>  /  TBili  0.4  /  DBili  x   /  AST  369<H>  /  ALT  218<H>  /  AlkPhos  159<H>        RADIOLOGY & ADDITIONAL STUDIES:    < from: CT Head No Cont (19 @ 15:28) >    IMPRESSION:    1)  diffuse anoxic changes to the brain with generalized cerebral edema and loss of gray-white differentiation. Follow-up MR imaging may be considered for further assessment, along with follow-up CT imaging.  2)  no obvious hemorrhage.    < end of copied text >    < from: CT Cervical Spine No Cont (19 @ 15:28) >    IMPRESSION:    No acute fracture identified. Multilevel degenerative changes.    < end of copied text >

## 2019-12-28 NOTE — PROGRESS NOTE ADULT - ASSESSMENT
Seen s/p cardiac arrest.  Seized in the beginning of the symptoms.  CT Head - diffuse anoxic changes to the brain with generalized cerebral edema and loss of gray-white differentiation. No obvious hemorrhage.  Brain stem functions are absent except that pt breathing over the vent.   Anoxic Encephalopathy.  Positive Troponin,  16 to 11 now  In IV Heparin.  Loaded with Depacon 1000 mg IV X1 yesterday.  Now on Depacon 500 mg IV Q12h.  No Video EEG is monitoring available in [facility], hence yesterday, I called Neurologist Dr. Lambert in [facility] For transfer  - Epileptologist called back for acceptance  - Pt will be transferred if hemodynamically stable. Not Stable at this point.   Given clinical presentation and signs of Anoxia on CT Head  - Overall prognosis seems very poor.   Utox screen is positive for Methamphetamines.  D/w pt's sister at bedside.  I called  Loree - [phone], spoke to him at length. Exam, radiological findings and pt's very poor prognosis is explained.  Likelihood of fruitful recovery at this point seems zero.  Would get Repeat CT Head in am.  D/w [facility] PA and covering nurse.  Would follow.

## 2019-12-28 NOTE — PROGRESS NOTE ADULT - ASSESSMENT
The patient is a 59 year old female with unknown past medical history who presents with cardiac arrest.    Plan:  - Urine tox noted - positive for methamphetamine, opiates, and methadone  - Troponin peaked at 28 and trending down. Cannot exclude type 1 NSTEMI, but more likely enzymes are elevated due to arrest and poor cardiac perfusion at that time.  - Continue heparin drip for duration of 48 hours as medical treatment for NSTEMI. If there are signs of bleeding, stop drip.  - CT head with no bleed, but anoxic brain injury present  - CT chest with multifocal PNA likely aspiration  - Ventricular ectopy on telemetry. Start metoprolol 5 mg IV q6h. If patient having runs of VT, would start amiodarone bolus and drip  - Echocardiogram with grossly normal LV systolic function  - Hypothermia protocol  - Mechanical ventilation  - Neurology follow-up  - Overall prognosis poor The patient is a 59 year old female with unknown past medical history who presents with cardiac arrest.    Plan:  - Urine tox noted - positive for methamphetamine, opiates, and methadone  - Troponin peaked at 28 and trending down. Cannot exclude type 1 NSTEMI, but more likely enzymes are elevated due to arrest and poor cardiac perfusion at that time.  - Continue heparin drip for duration of 48 hours as medical treatment for NSTEMI. If there are signs of bleeding, stop drip.  - CT head with no bleed, but anoxic brain injury present  - CT chest with multifocal PNA likely aspiration  - Ventricular ectopy on telemetry. Start metoprolol 5 mg IV q6h. If patient having runs of VT, would start amiodarone bolus and drip  - Echocardiogram with grossly normal LV systolic function  - Hypothermia protocol  - Mechanical ventilation  - Neurology follow-up  - Overall prognosis poor    ADDENDUM:  - Noted to have a brief run of PMVT on telemetry. Calcium and magnesium to be given. ECG with prolonged QTc around 560.  - Metoprolol discontinued. Avoid bradycardia. If HR goes down or if recurrent PMVT, will need to be started on lidocaine drip and/or transvenous pacemaker for overdrive pacing.

## 2019-12-28 NOTE — PROGRESS NOTE ADULT - PROBLEM SELECTOR PLAN 1
card arrest - hypothermia protocol in effect  anoxic brain inj - neuro eval noted  I and O  shock liver  serial labs, BG noted, vent support with sedation, monitor vs and HD and Sat  cont Hypothermia protocol for a total of 24 hrs - 36 deg C target -   versed, fentanyl, propofol  depacon - AED   CT head and chest reviewed  poss Asp pna - on ZOSYN  prognosis poor  smoker, opioid dep -   proventil - QID  will follow  discussed with

## 2019-12-28 NOTE — PROGRESS NOTE ADULT - ATTENDING COMMENTS
32 minutes of critical care time spent with the patient and coordinating care with nursing, hospitalist, and consultants. Patient is critically ill requiring ICU care due to cardiac arrest, respiratory failure, NSTEMI, anoxic brain injury. The patient is high risk for deterioration and death.

## 2019-12-28 NOTE — PROGRESS NOTE ADULT - SUBJECTIVE AND OBJECTIVE BOX
Chief Complaint: Cardiac arrest    Interval Events: No significant events overnight. Started on heparin drip. BP on higher side.    Review of Systems:  General: No fevers, chills, weight loss or gain  Skin: No rashes, color changes  Cardiovascular: No chest pain, orthopnea  Respiratory: No shortness of breath, cough  Gastrointestinal: No nausea, abdominal pain  Genitourinary: No incontinence, pain with urination  Musculoskeletal: No pain, swelling, decreased range of motion  Neurological: No headache, weakness  Psychiatric: No depression, anxiety  Endocrine: No weight loss or gain, increased thirst  All other systems are comprehensively negative.    Physical Exam:  Vitals:        Vital Signs Last 24 Hrs  T(C): 35.2 (28 Dec 2019 07:00), Max: 36.6 (27 Dec 2019 20:00)  T(F): 95.4 (28 Dec 2019 07:00), Max: 97.9 (27 Dec 2019 20:00)  HR: 74 (28 Dec 2019 08:00) (63 - 98)  BP: 153/99 (28 Dec 2019 08:00) (64/47 - 165/82)  BP(mean): 114 (28 Dec 2019 08:00) (83 - 114)  RR: 16 (28 Dec 2019 08:00) (16 - 38)  SpO2: 99% (28 Dec 2019 08:00) (93% - 100%)  General: NAD  HEENT: MMM  Neck: No JVD, no carotid bruit  Lungs: CTAB  CV: RRR, nl S1/S2, no M/R/G  Abdomen: S/NT/ND, +BS  Extremities: No LE edema, no cyanosis  Neuro: AAOx3, non-focal  Skin: No rash    Labs:                        11.8   12.72 )-----------( 266      ( 28 Dec 2019 07:05 )             36.3     12-28    142  |  110<H>  |  20  ----------------------------<  130<H>  4.6   |  24  |  1.09    Ca    7.8<L>      28 Dec 2019 07:05  Phos  4.2     12-28  Mg     1.8     12-28    TPro  5.9<L>  /  Alb  2.6<L>  /  TBili  0.4  /  DBili  x   /  AST  424<H>  /  ALT  236<H>  /  AlkPhos  172<H>  12-28    CARDIAC MARKERS ( 28 Dec 2019 08:23 )  16.133 ng/mL / x     / x     / x     / x      CARDIAC MARKERS ( 28 Dec 2019 02:21 )  28.920 ng/mL / x     / x     / x     / x      CARDIAC MARKERS ( 27 Dec 2019 19:01 )  19.278 ng/mL / x     / x     / x     / x      CARDIAC MARKERS ( 27 Dec 2019 14:55 )  .394 ng/mL / x     / x     / x     / x          PT/INR - ( 28 Dec 2019 07:05 )   PT: 13.1 sec;   INR: 1.20 ratio         PTT - ( 28 Dec 2019 07:05 )  PTT:65.5 sec    Telemetry: Sinus rhythm, PVCs, ventricular bigeminy Chief Complaint: Cardiac arrest    Interval Events: No significant events overnight. Started on heparin drip. BP on higher side.    Review of Systems:  Unable to obtain    Physical Exam:  Vitals:        Vital Signs Last 24 Hrs  T(C): 35.2 (28 Dec 2019 07:00), Max: 36.6 (27 Dec 2019 20:00)  T(F): 95.4 (28 Dec 2019 07:00), Max: 97.9 (27 Dec 2019 20:00)  HR: 74 (28 Dec 2019 08:00) (63 - 98)  BP: 153/99 (28 Dec 2019 08:00) (64/47 - 165/82)  BP(mean): 114 (28 Dec 2019 08:00) (83 - 114)  RR: 16 (28 Dec 2019 08:00) (16 - 38)  SpO2: 99% (28 Dec 2019 08:00) (93% - 100%)  General: Intubated  HEENT: ET tube in place  Neck: No JVD, no carotid bruit  Lungs: CTAB  CV: RRR, nl S1/S2, no M/R/G  Abdomen: S/NT/ND, +BS  Extremities: No LE edema, no cyanosis  Neuro: Sedated  Skin: No rash    Labs:                        11.8   12.72 )-----------( 266      ( 28 Dec 2019 07:05 )             36.3     12-28    142  |  110<H>  |  20  ----------------------------<  130<H>  4.6   |  24  |  1.09    Ca    7.8<L>      28 Dec 2019 07:05  Phos  4.2     12-28  Mg     1.8     12-28    TPro  5.9<L>  /  Alb  2.6<L>  /  TBili  0.4  /  DBili  x   /  AST  424<H>  /  ALT  236<H>  /  AlkPhos  172<H>  12-28    CARDIAC MARKERS ( 28 Dec 2019 08:23 )  16.133 ng/mL / x     / x     / x     / x      CARDIAC MARKERS ( 28 Dec 2019 02:21 )  28.920 ng/mL / x     / x     / x     / x      CARDIAC MARKERS ( 27 Dec 2019 19:01 )  19.278 ng/mL / x     / x     / x     / x      CARDIAC MARKERS ( 27 Dec 2019 14:55 )  .394 ng/mL / x     / x     / x     / x          PT/INR - ( 28 Dec 2019 07:05 )   PT: 13.1 sec;   INR: 1.20 ratio         PTT - ( 28 Dec 2019 07:05 )  PTT:65.5 sec    Telemetry: Sinus rhythm, PVCs, ventricular bigeminy

## 2019-12-28 NOTE — PROGRESS NOTE ADULT - ASSESSMENT
58 y/o F w/ pmh of opioid abuse, depression, ovarian ca, presented to [facility] earlier today in cardiac arrest. Pt unable to provide hx, no family present. Per EMS pt was reported having been talking to friends when she suddenly had a seizure and then stopped breathing, per EMS friends immediately called 911 and EMS found pt in vfib. ACLS protocol was started pt received x4 epi and 2 shock and ROSC was achieve while in route to the ED pt arrest again and came into the trauma bay in cardiac arrest. ROSC was achieved again during 1st pulse checked. Pt now admitted to the SPCU for cardiac arrest.    Cardiac Arrest   Etiology unknown but suspect Methamphetiamine OD   Follow up on Echocardiogram and Cardiolgy consulted   Continue TTM and MAP >90  Pressors PRN; Intubated and Sedated with Propofol   CT Head reveals early anoxic brain injury   Will need to have GOC conversation with family when they come in and also for more history   CC: Dr. Ramírez    Acute Respiratory Failure   Possible Aspiration; IV Zosyn + IV Vancomycin; Nebulizers PRN   Inutabted and Sedated   Follow up cultures     Acute Renal Failure  Baseline Creatinine unknown   From Cardiac Arrest  Renally dose all medications and avoid Nephrotoxic agents   Monitor BMP and electrolytes     Transaminitis   Most likely from shock with possible underlying liver disease   Will maintain MAP and trend for now    Diet  NPO    DVT Prophylaxis  Heparin SC TID     Disposition  Full Code/Inpatient  Poor Prognosis 58 y/o F w/ pmh of opioid abuse, depression, ovarian ca, presented to [facility] earlier today in cardiac arrest. Pt unable to provide hx, no family present. Per EMS pt was reported having been talking to friends when she suddenly had a seizure and then stopped breathing, per EMS friends immediately called 911 and EMS found pt in vfib. ACLS protocol was started pt received x4 epi and 2 shock and ROSC was achieve while in route to the ED pt arrest again and came into the trauma bay in cardiac arrest. ROSC was achieved again during 1st pulse checked. Pt now admitted to the SPCU for cardiac arrest.    Cardiac Arrest   Etiology unknown but suspect Methamphetiamine OD   Heparin infusion + Metoprolol IV Q6H   Follow up on Echocardiogram and Cardiolgy consulted   Continue TTM and MAP >90  Pressors PRN; Intubated and Sedated with Propofol   CT Head reveals early anoxic brain injury   Will need to have GOC conversation with family when they come in and also for more history   CC: Dr. Ramírez    Acute Respiratory Failure   Possible Aspiration; IV Zosyn + IV Vancomycin; Nebulizers PRN   Inutabted and Sedated   Follow up cultures     Acute Renal Failure  Baseline Creatinine unknown   From Cardiac Arrest  Renally dose all medications and avoid Nephrotoxic agents   Monitor BMP and electrolytes     Transaminitis   Most likely from shock with possible underlying liver disease   Will maintain MAP and trend for now    Diet  NPO    DVT Prophylaxis  Heparin SC TID     Disposition  Full Code/Inpatient  Poor Prognosis

## 2019-12-28 NOTE — PROGRESS NOTE ADULT - SUBJECTIVE AND OBJECTIVE BOX
Patient is a 59 year old female with a pmhx of depression, opioid abuse (on methadone), ovarian ca who was brought in 12/27/19 for a cardiac arrest. Per family at bedside patient had seizure like activity while at home and subsequently collapsed in front of family member. EMS called and patient found in vfib arrest. Approximate down time was 10 minutes. She was then intubated in field, ACLS protocol initiated, 4 epis and x shocks given en route to [facility]. ROSC achieved. Patient then went into cardiac arrest again, ACLS initiated and ROSC achieved Upon presentation to the ED she was found to have CTH with diffuse anoxic injury with generalized edema. CT chest/abd with aspiration pneumonitis and colonic ileus. She was started on hypothermia protocol given not meaningful neuro exam post cardiac arrest. Troponin was also 19 with diffuse ST segment depression on EK. Started on heparin drip and admitted to SPCU.     She has finished her 24 hour cooling peroid and has not had any return in her mental status. She remains hypertensive, with anoxic brain injury with no meaning full neurologic exam. This afternoon patient had run of Torsdaes/polymorphic VTACH which broke on its one. 1G of magnesium was given. repeat mg level 2.3. EKG shows a QT of 514.     This evening I have had a conversation with family at bedside. Explained high likelihood of no meaningful recovery and likely event that patient may code again tonight. They understand the prognosis and would like to wait to make patient a DNR in the morning when all the family can be here. They are strongly considering withdraw of care given patient's prognosis. However at this time she remains a full code.    Tonight I have given hydralazine 5mg x1 and now 10mg x1 for SBP control. She remains HTN in the setting of anoxic brain injury, likely impending brain herniation. Will consider drip if BP remains uncontrolled despite measures stated.    If patient were to code again will consider amio vs lidocaine pending Rhythm  analysis   cont heparin drip and zosyn     Case d/w Dr. Snider     Critical Care time: 35 mins assessing presenting problems of acute illness that poses high probability of life threatening deterioration or end organ damage/dysfunction.  Medical decision making inculding Initiating plan of care, reviewing data, reviewing radiology,direct patient bedside evaluation and interpretation of vital signs, any necessary ventilator management , discusion with multidisciplinary team, discussing goals of care with patient/family, all non inclusive of procedures Patient is a 59 year old female with a pmhx of depression, opioid abuse (on methadone), ovarian ca who was brought in 12/27/19 for a cardiac arrest. Per family at bedside patient had seizure like activity while at home and subsequently collapsed in front of family member. EMS called and patient found in vfib arrest. Approximate down time was 10 minutes. She was then intubated in field, ACLS protocol initiated, 4 epis and x shocks given en route to [facility]. ROSC achieved. Patient then went into cardiac arrest again, ACLS initiated and ROSC achieved Upon presentation to the ED she was found to have CTH with diffuse anoxic injury with generalized edema. CT chest/abd with aspiration pneumonitis and colonic ileus. She was started on hypothermia protocol given not meaningful neuro exam post cardiac arrest. Troponin was also 19 with diffuse ST segment depression on EK. Started on heparin drip and admitted to SPCU.     She has finished her 24 hour cooling peroid and has not had any return in her mental status. She remains hypertensive, with anoxic brain injury with no meaning full neurologic exam. This afternoon patient had run of Torsdaes/polymorphic VTACH which broke on its one. 1G of magnesium was given. repeat mg level 2.3. EKG shows a QT of 514.     This evening I have had a conversation with family at bedside. Explained high likelihood of no meaningful recovery and likely event that patient may code again tonight. They understand the prognosis and would like to wait to make patient a DNR in the morning when all the family can be here. They are strongly considering withdraw of care given patient's prognosis. However at this time she remains a full code.    Tonight I have given hydralazine 5mg x1 and now 10mg x1 for SBP control. She remains HTN in the setting of anoxic brain injury, likely impending brain herniation. Will start nicardipine infusion titrate to a MAP of 100-110    If patient were to code again will consider amio vs lidocaine bolus and then drip pending Rhythm analysis   cont heparin drip and zosyn     Case d/w Dr. Snider     Critical Care time: 63 mins assessing presenting problems of acute illness that poses high probability of life threatening deterioration or end organ damage/dysfunction.  Medical decision making inculding Initiating plan of care, reviewing data, reviewing radiology,direct patient bedside evaluation and interpretation of vital signs, any necessary ventilator management , discusion with multidisciplinary team, discussing goals of care with patient/family, all non inclusive of procedures Patient is a 59 year old female with a pmhx of depression, opioid abuse (on methadone), ovarian ca who was brought in 12/27/19 for a cardiac arrest. Per family at bedside patient had seizure like activity while at home and subsequently collapsed in front of family member. EMS called and patient found in vfib arrest. Approximate down time was 10 minutes. She was then intubated in field, ACLS protocol initiated, 4 epis and x shocks given en route to [facility]. ROSC achieved. Patient then went into cardiac arrest again, ACLS initiated and ROSC achieved Upon presentation to the ED she was found to have CTH with diffuse anoxic injury with generalized edema. CT chest/abd with aspiration pneumonitis and colonic ileus. She was started on hypothermia protocol given not meaningful neuro exam post cardiac arrest. Troponin was also 19 with diffuse ST segment depression on EK. Started on heparin drip and admitted to SPCU.     She has finished her 24 hour cooling peroid and has not had any return in her mental status. She remains hypertensive, with anoxic brain injury with no corneal reflex, gag or withdraw to pain. She does overbreathe the ventilator. This afternoon patient had run of Torsdaes/polymorphic VTACH which broke on its one. 1G of magnesium was given. repeat mg level 2.3. EKG shows a QT of 514.     This evening I have had a conversation with family at bedside. Explained high likelihood of no meaningful recovery and likely event that patient may code again tonight. They understand the prognosis and would like to wait to make patient a DNR in the morning when all the family can be here. They are strongly considering withdraw of care given patient's prognosis. However at this time she remains a full code.    Tonight I have given hydralazine 5mg x1 and now 10mg x1 for SBP control. She remains HTN in the setting of anoxic brain injury, likely impending brain herniation. Will start nicardipine infusion titrate to a MAP of 100-110    If patient were to code again will consider amio vs lidocaine bolus and then drip pending Rhythm analysis   cont heparin drip and zosyn     Case d/w Dr. Snider     Critical Care time: 63 mins assessing presenting problems of acute illness that poses high probability of life threatening deterioration or end organ damage/dysfunction.  Medical decision making inculding Initiating plan of care, reviewing data, reviewing radiology,direct patient bedside evaluation and interpretation of vital signs, any necessary ventilator management , discusion with multidisciplinary team, discussing goals of care with patient/family, all non inclusive of procedures Patient is a 59 year old female with a pmhx of depression, opioid abuse (on methadone), ovarian ca who was brought in 12/27/19 for a cardiac arrest. Per family at bedside patient had seizure like activity while at home and subsequently collapsed in front of family member. EMS called and patient found in vfib arrest. Approximate down time was 10 minutes. She was then intubated in field, ACLS protocol initiated, 4 epis and x shocks given en route to [facility]. ROSC achieved. Patient then went into cardiac arrest again, ACLS initiated and ROSC achieved Upon presentation to the ED she was found to have CTH with diffuse anoxic injury with generalized edema. CT chest/abd with aspiration pneumonitis and colonic ileus. She was started on hypothermia protocol given not meaningful neuro exam post cardiac arrest. Troponin was also 19 with diffuse ST segment depression on EK. Started on heparin drip and admitted to SPCU.     She has finished her 24 hour cooling peroid and has not had any return in her mental status. She remains hypertensive, with anoxic brain injury with no corneal reflex, gag or withdraw to pain. She does overbreathe the ventilator. This afternoon patient had run of Torsdaes/polymorphic VTACH which broke on its one. 1G of magnesium was given. repeat mg level 2.3. EKG shows a QT of 514.     This evening I have had a conversation with family at bedside. Explained high likelihood of no meaningful recovery and likely event that patient may code again tonight. They understand the prognosis and would like to wait to make patient a DNR in the morning when all the family can be here. They are strongly considering withdraw of care given patient's prognosis. However at this time she remains a full code.    Tonight I have given hydralazine 5mg x1 and now 10mg x1 for SBP control. She remains HTN in the setting of anoxic brain injury, likely impending brain herniation. Will start nicardipine infusion titrate to a MAP of 100-110    If patient were to code again will consider amio vs lidocaine bolus and then drip pending Rhythm analysis   cont heparin drip and zosyn     Case d/w Dr. Snider     Critical Care time: 63 mins assessing presenting problems of acute illness that poses high probability of life threatening deterioration or end organ damage/dysfunction.  Medical decision making inculding Initiating plan of care, reviewing data, reviewing radiology,direct patient bedside evaluation and interpretation of vital signs, any necessary ventilator management , discusion with multidisciplinary team, discussing goals of care with patient/family, all non inclusive of procedures      ADDENDUM:  Patient BP dropped to a MAP of 50 before cardene could be started. Will start levophed and titrate to a MAP of 65 -70  I have considered getting a CT head to r/o herniation of brain which is likely physiology however do think that she is currently unstable  for this test

## 2019-12-28 NOTE — CHART NOTE - NSCHARTNOTEFT_GEN_A_CORE
Asked to see pt for some bradycardia after x2 dose of lopressor 2.5mg pt developed mild bradcardia into the 50s now improved, Lopressor d/c, pt continued to have elevated bp SBP >200 for which I order 5mg IV hydralazine. Pt also noted to have a run of VT vs torsades? ordered repeat chem and lytes, K of 4.5, repeat mag 2.5 (will give additional 1g of mag), phos 4.6 and EKG noted with prolonged QTC and reviewed w/ dr. jiang, if pt continues to have VT/torsades like Rhythm will consider lidocaine drip.

## 2019-12-28 NOTE — PROGRESS NOTE ADULT - SUBJECTIVE AND OBJECTIVE BOX
Date/Time Patient Seen:  		  Referring MD:   Data Reviewed	       Patient is a 59y old  Female who presents with a chief complaint of cardiac arrest (27 Dec 2019 19:36)      Subjective/HPI     PAST MEDICAL & SURGICAL HISTORY:        Medication list         MEDICATIONS  (STANDING):  ALBUTerol    90 MICROgram(s) HFA Inhaler 2 Puff(s) Inhalation every 6 hours  chlorhexidine 0.12% Liquid 15 milliLiter(s) Oral Mucosa every 12 hours  dextrose 5%. 1000 milliLiter(s) (50 mL/Hr) IV Continuous <Continuous>  dextrose 50% Injectable 12.5 Gram(s) IV Push once  dextrose 50% Injectable 25 Gram(s) IV Push once  dextrose 50% Injectable 25 Gram(s) IV Push once  heparin  Infusion.  Unit(s)/Hr (7.5 mL/Hr) IV Continuous <Continuous>  hydrocortisone sodium succinate Injectable 50 milliGRAM(s) IV Push every 6 hours  insulin lispro (HumaLOG) corrective regimen sliding scale   SubCutaneous every 2 hours  lactated ringers. 1000 milliLiter(s) (125 mL/Hr) IV Continuous <Continuous>  pantoprazole  Injectable 40 milliGRAM(s) IV Push daily  piperacillin/tazobactam IVPB.. 3.375 Gram(s) IV Intermittent every 8 hours  propofol Infusion 5 MICROgram(s)/kG/Min (1.95 mL/Hr) IV Continuous <Continuous>  valproate sodium IVPB 500 milliGRAM(s) IV Intermittent two times a day    MEDICATIONS  (PRN):  dextrose 40% Gel 15 Gram(s) Oral once PRN Blood Glucose LESS THAN 70 milliGRAM(s)/deciliter  fentaNYL    Injectable 25 MICROGram(s) IV Push every 1 hour PRN pain, distress, dyspnea  glucagon  Injectable 1 milliGRAM(s) IntraMuscular once PRN Glucose LESS THAN 70 milligrams/deciliter  heparin  Injectable 3800 Unit(s) IV Push every 6 hours PRN For aPTT less than 40  midazolam Injectable 2 milliGRAM(s) IV Push every 30 minutes PRN distress, dyspnea, anxiety, myoclonus         Vitals log        ICU Vital Signs Last 24 Hrs  T(C): 35.2 (28 Dec 2019 07:00), Max: 36.6 (27 Dec 2019 20:00)  T(F): 95.4 (28 Dec 2019 07:00), Max: 97.9 (27 Dec 2019 20:00)  HR: 70 (28 Dec 2019 07:00) (63 - 98)  BP: 149/94 (28 Dec 2019 07:00) (64/47 - 165/82)  BP(mean): 107 (28 Dec 2019 07:00) (83 - 108)  ABP: 164/92 (28 Dec 2019 05:00) (128/75 - 164/92)  ABP(mean): 116 (28 Dec 2019 05:00) (96 - 118)  RR: 18 (28 Dec 2019 07:00) (16 - 38)  SpO2: 99% (28 Dec 2019 07:00) (93% - 100%)       Mode: AC/ CMV (Assist Control/ Continuous Mandatory Ventilation)  RR (machine): 16  TV (machine): 400  FiO2: 30  PEEP: 5  ITime: 1  MAP: 9  PIP: 22      Input and Output:  I&O's Detail    27 Dec 2019 07:01  -  28 Dec 2019 07:00  --------------------------------------------------------  IN:    Enteral Tube Flush: 220 mL    heparin  Infusion.: 82.5 mL    IV PiggyBack: 200 mL    lactated ringers.: 1875 mL    propofol Infusion: 20 mL    propofol Infusion: 4 mL    Solution: 300 mL  Total IN: 2701.5 mL    OUT:    Indwelling Catheter - Urethral: 1100 mL  Total OUT: 1100 mL    Total NET: 1601.5 mL          Lab Data                        11.8   12.72 )-----------( 266      ( 28 Dec 2019 07:05 )             36.3     12-28    142  |  110<H>  |  20  ----------------------------<  130<H>  4.6   |  24  |  1.09    Ca    7.8<L>      28 Dec 2019 07:05  Phos  3.9     12-28  Mg     1.9     12-28    TPro  5.9<L>  /  Alb  2.6<L>  /  TBili  0.4  /  DBili  x   /  AST  424<H>  /  ALT  236<H>  /  AlkPhos  172<H>  12-28    ABG - ( 28 Dec 2019 07:17 )  pH, Arterial: x     pH, Blood: 7.29  /  pCO2: 43    /  pO2: 138   / HCO3: 20    / Base Excess: -5.3  /  SaO2: 99                CARDIAC MARKERS ( 28 Dec 2019 02:21 )  28.920 ng/mL / x     / x     / x     / x      CARDIAC MARKERS ( 27 Dec 2019 19:01 )  19.278 ng/mL / x     / x     / x     / x      CARDIAC MARKERS ( 27 Dec 2019 14:55 )  .394 ng/mL / x     / x     / x     / x            Review of Systems	      Objective     Physical Examination    head at  heart s1s2  lung dec BS  abd soft      Pertinent Lab findings & Imaging      Ricci:  NO   Adequate UO     I&O's Detail    27 Dec 2019 07:01  -  28 Dec 2019 07:00  --------------------------------------------------------  IN:    Enteral Tube Flush: 220 mL    heparin  Infusion.: 82.5 mL    IV PiggyBack: 200 mL    lactated ringers.: 1875 mL    propofol Infusion: 20 mL    propofol Infusion: 4 mL    Solution: 300 mL  Total IN: 2701.5 mL    OUT:    Indwelling Catheter - Urethral: 1100 mL  Total OUT: 1100 mL    Total NET: 1601.5 mL               Discussed with:     Cultures:	        Radiology

## 2019-12-28 NOTE — PROGRESS NOTE ADULT - SUBJECTIVE AND OBJECTIVE BOX
Initial CT Head done yesterday.  Would do repeat CT Head, 72 hrs after initial CT Head done  - Will do on 12/30 am.

## 2019-12-28 NOTE — PROGRESS NOTE ADULT - ASSESSMENT
58 y/o F w/ pmh of opioid abuse, depression, ovarian ca, presented to [facility] earlier today in cardiac arrest. Pt unable to provide hx, no family present. Per EMS pt was reported having been talking to friends when she suddenly had a seizure and then stopped breathing, per EMS friends immediately called 911 and EMS found pt in vfib. ACLS protocol was started pt received x4 epi and 2 shock and ROSC was achieve while in route to the ED pt arrest again and came into the trauma bay in cardiac arrest. ROSC was achieved again during 1st pulse checked. Pt now admitted to the SPCU for cardiac arrest.    -Neuro: Diffuse anxious brain injury noted on CTH, otherwise PAN-SCAN w/ no acute finding, neurology consult input noted, pt statted on depacon, possible EEG?, monitor mental status closely  -Cardiac: s/p cardiac arrest, will start pt on hypothermia protocol, maintain core temp at 36, maintain MAP >90, pt hypertensive was started on IVP 5mg lopressor but had to be d/c 2/2 to bradycardia will consider started IV hydralazine? can be possibly from cushing reflex?? cont heparin drip for NSTEMI but now trending down  -Resp: Cont lung protective ventilation, titrate fio2 down to 21% if possible and maintain o2 >92%, cont propofol for vent synchrony   -GI: NPO for now, GI ppx w/ protonix   -Renal: LORNE slightly improved, cont to monitor renal function and lyntes closely, replete w/ renal precautions, monitor I&O closely  -ID: Possible asp event cont IV zosyn for now, monitor Wbc/temp for now, LA at 13 now resolved, f/u on urine/blood cx and procalcitonin levels  -Endo: No acute process, monitor MS closely given critical illness  -Heme: DVT ppx w/ heparin for now  -Dispo: Admit to ICU, pt w/ critical illness, giving CTH finding prognosis is poor, there is a possibility that cardiac arrest could have been drug OD on amphetamine?, case d/w, cardiology, neurology, hospitalize dr. arredondo and Oc

## 2019-12-28 NOTE — PROGRESS NOTE ADULT - SUBJECTIVE AND OBJECTIVE BOX
HPI: 60 y/o F w/ pmh of opioid abuse, depression, ovarian ca, presented to [facility] earlier today in cardiac arrest. Pt unable to provide hx, no family present. Per EMS pt was reported having been talking to friends when she suddenly had a seizure and then stopped breathing, per EMS friends immediately called 911 and EMS found pt in vfib. ACLS protocol was started pt received x4 epi and 2 shock and ROSC was achieve while in route to the ED pt arrest again and came into the trauma bay in cardiac arrest. ROSC was achieved again during 1st pulse checked. During my evaluation in the ED pt was bradycardic 40-60s, SBP 60 for which she was given 3L of wide open w/ some improvement in fluids. Pt underwent PAN-SCAN to r/o any acute brain/cervical/chest/abd/pelvis pathology and admitted to the ICU for further tx and evaluation. No further information available at this time.        24 hour events: Overnight pt noted to have trop eleveated to 28 and was started on heparin drip was started. Today morning pt trops started to trend down. Pt have lopressor 5mg IVP added for BP but started to become bradycardic and lopressor was d/c. No other major overnight events.    Review of Systems: Unable to obtain ROS     T(F): 93.9 (19 @ 10:00), Max: 97.9 (19 @ 20:00)  HR: 56 (19 @ 12:00) (56 - 98)  BP: 140/101 (19 @ 12:00) (64/47 - 165/82)  RR: 16 (19 @ 12:00) (16 - 38)  SpO2: 99% (19 @ 12:00) (93% - 100%)  Wt(kg): --    Mode: AC/ CMV (Assist Control/ Continuous Mandatory Ventilation), RR (machine): 16, TV (machine): 400, FiO2: 30, PEEP: 5  19 @ 07:01  -  19 @ 07:00  --------------------------------------------------------  IN: 2701.5 mL / OUT: 1100 mL / NET: 1601.5 mL        CAPILLARY BLOOD GLUCOSE      POCT Blood Glucose.: 123 mg/dL (28 Dec 2019 12:12)      I&O's Summary    27 Dec 2019 07:01  -  28 Dec 2019 07:00  --------------------------------------------------------  IN: 2701.5 mL / OUT: 1100 mL / NET: 1601.5 mL        Physical Exam:   Neuro: obtunded, no gag reflux noted, no responds to verbal/tactile or painful stimuli   HEENT: pupils 2.5mm non-reactive to light, +R. EJ line placed  Resp: Good air entry b/l  CVS: +RRR  Abd: BSx4, soft, nt/nd  Ext: no edema  Skin: warm/dry    Meds:  piperacillin/tazobactam IVPB.. IV Intermittent      dextrose 40% Gel Oral PRN  dextrose 50% Injectable IV Push  dextrose 50% Injectable IV Push  dextrose 50% Injectable IV Push  glucagon  Injectable IntraMuscular PRN  insulin lispro (HumaLOG) corrective regimen sliding scale SubCutaneous    ALBUTerol    90 MICROgram(s) HFA Inhaler Inhalation    fentaNYL    Injectable IV Push PRN  midazolam Injectable IV Push PRN  propofol Infusion IV Continuous  valproate sodium IVPB IV Intermittent      heparin  Infusion. IV Continuous  heparin  Injectable IV Push PRN    pantoprazole  Injectable IV Push      dextrose 5%. IV Continuous  lactated ringers. IV Continuous      chlorhexidine 0.12% Liquid Oral Mucosa                              11.8   13.67 )-----------( 261      ( 28 Dec 2019 12:03 )             36.3           141  |  108  |  18  ----------------------------<  131<H>  4.4   |  23  |  0.93    Ca    7.8<L>      28 Dec 2019 12:03  Phos  4.5       Mg     2.7         TPro  5.9<L>  /  Alb  2.6<L>  /  TBili  0.4  /  DBili  x   /  AST  369<H>  /  ALT  218<H>  /  AlkPhos  159<H>      Lactate 1.5            @ 12:04    Lactate 1.3            @ 07:07    Lactate 1.4            @ 02:21    Lactate 2.5            @ 19:01    Lactate 13.6            @ 14:59      CARDIAC MARKERS ( 28 Dec 2019 12:09 )  11.050 ng/mL / x     / x     / x     / x      CARDIAC MARKERS ( 28 Dec 2019 08:23 )  16.133 ng/mL / x     / x     / x     / x      CARDIAC MARKERS ( 28 Dec 2019 02:21 )  28.920 ng/mL / x     / x     / x     / x      CARDIAC MARKERS ( 27 Dec 2019 19:01 )  19.278 ng/mL / x     / x     / x     / x      CARDIAC MARKERS ( 27 Dec 2019 14:55 )  .394 ng/mL / x     / x     / x     / x          PT/INR - ( 28 Dec 2019 12:03 )   PT: 13.0 sec;   INR: 1.19 ratio         PTT - ( 28 Dec 2019 12:03 )  PTT:88.1 sec  Urinalysis Basic - ( 27 Dec 2019 14:59 )    Color: Yellow / Appearance: Clear / S.010 / pH: x  Gluc: x / Ketone: Negative  / Bili: Negative / Urobili: Negative mg/dL   Blood: x / Protein: Negative mg/dL / Nitrite: Negative   Leuk Esterase: Trace / RBC: x / WBC 3-5   Sq Epi: x / Non Sq Epi: Occasional / Bacteria: Occasional      Radiology:   < from: US Duplex Venous Lower Ext Complete, Bilateral (19 @ 22:17) >  PROCEDURE DATE:  2019          INTERPRETATION:  [facility] RADIOLOGIST PRELIMINARY REPORT - [facility] Radiologist Dr. Juan Aleman    PROCEDURE INFORMATION:   Exam: US Duplex Lower Extremity Veins   Exam date and time: 2019 9:24 PM   Age: 59 years old   Clinical indication: Other: Cardiac arrest     TECHNIQUE:   Imaging protocol: Real-time duplex ultrasound of the Lower Extremities with 2-D   gray scale, color Doppler flow and spectral waveform analysis with image   documentation. Complete exam focused on the bilateral lower extremity veins.     COMPARISON:   No relevant prior studies available.     FINDINGS:   Right deep veins: Unremarkable. The common femoral, femoral, proximal profunda   femoral and popliteal veins are patent without thrombus. Normal Doppler   waveforms. Normal compressibility and/or augmentation response.    Right superficial veins: Saphenofemoral junction is patent without thrombus.     Leftdeep veins: Unremarkable. The common femoral, femoral, proximal profunda   femoral and popliteal veins are patent without thrombus. Normal Doppler   waveforms. Normal compressibility and/or augmentation response.    Left superficial veins: Saphenofemoral junction is patent without thrombus.     Soft tissues: Unremarkable.     IMPRESSION:   No acute findings. No evidence of deep vein thrombosis.    AGREE WITH THE ABOVE FINDINGS AND REPORT      DIMITRI SOARES M.D., ATTENDING RADIOLOGIST  Thisdocument has been electronically signed. Dec 28 2019  8:33AM    < end of copied text >    EXAM:  XR CHEST PORTABLE IMMED 1V                              PROCEDURE DATE:  2019        INTERPRETATION:  Clinical information: NG tube placement.    Technique: Frontal view of the lower chest and upper abdomen.    Comparison: None available.    Findings: The patient is status post NG tube placement with the NG tube tip approximately 3 cm below the left hemidiaphragm. The NG tube can be advanced for more optimal positioning.    Evaluation of the lungs, heart, and abdominal cavityis limited on this portable study.    IMPRESSION: Status post placement of an NG tube with the tip approximately 3 cm below the left hemidiaphragm. The NG tube can be advanced for more optimal positioning.      DIMITRI SOARES M.D., ATTENDING RADIOLOGIST  This document has been electronically signed. Dec 28 2019  8:23AM    EXAM:  CT CHEST                                  PROCEDURE DATE:  2019          INTERPRETATION:  CLINICAL INFORMATION: Cardiac arrest    COMPARISON: None.    PROCEDURE:   CT of the Chest, Abdomen and Pelvis was performed without intravenous contrast.   Intravenous contrast: None.  Oral contrast: None.  Sagittal and coronal reformats were performed.    FINDINGS:    CHEST:     LUNGS AND LARGE AIRWAYS: Patent central airways. Endotracheal tube in position. Multifocal bilateral airspace and groundglass infiltrates in the lingula segment of left upper lobe left lower lobe and the right upper lobe presumptive multifocal pneumonia in appropriate clinical setting. Element of aspiration could be present. Dependent atelectasis left lower lobe.  PLEURA: No pleural effusion.  VESSELS: Nonaneurysmal  HEART: Heart size is normal. No pericardial effusion.  MEDIASTINUM AND BRYON: Scattered nonspecific subcentimeter mediastinal lymph nodes. Lack of IV contrast limits hilar evaluation  CHEST WALL AND LOWER NECK: Within normal limits.    ABDOMEN AND PELVIS:    LIVER: Within normal limits.  BILE DUCTS: Normal caliber.  GALLBLADDER: Within normal limits.  SPLEEN: Within normal limits.  PANCREAS: Within normal limits.  ADRENALS: Within normal limits.  KIDNEYS/URETERS: Within normal limits.    BLADDER: Partially decompressed with Noble  REPRODUCTIVE ORGANS: Calcified uterine fibroid    BOWEL: No bowel obstruction. Moderate colonic ileus.. Appendix no evidence of appendicitis  PERITONEUM: No ascites.  VESSELS: Nonaneurysmal.  RETROPERITONEUM/LYMPH NODES: No lymphadenopathy.    ABDOMINAL WALL: Within normal limits.  BONES: Within normal limits.    IMPRESSION:     Multifocal bilateral infiltrates as described consistent with pneumonia in the appropriate clinical setting. An element of aspiration is considered.  No specific acute pathology abdomen and pelvis.  Moderate colonic ileus.      YASMANY NELSON M.D., ATTENDING RADIOLOGIST  This document has been electronically signed. Dec 27 2019  4:22PM      EXAM:  CT CERVICAL SPINE                            PROCEDURE DATE:  2019          INTERPRETATION:  INDICATIONS:  neck pain. Trauma.    TECHNIQUE:  Thin section CT imaging was conducted.  3-D, Coronal and sagittal reformations were generated from the axial data.      FINDINGS:      There is alteration of the cervical lordosis which may reflect positioning or spasm.     No acute fracture or destructive lesion is noted.    There is a subluxation with anterolisthesis at C4-5 and C7-T1.Chronic degenerative changes are noted from C3 to T1 with multilevel ridge disc complexes.    ET tube is noted within the trachea. The distal tip is not included on the study.      IMPRESSION:    No acute fracture identified. Multilevel degenerative changes.    MAYA BUTLER M.D., ATTENDING RADIOLOGIST  This document has been electronically signed. Dec 27 2019  3:18PM    EXAM:  CT BRAIN                          PROCEDURE DATE:  2019        INTERPRETATION:  INDICATION:  Altered mental status. Status post cardiac arrest.  TECHNIQUE:  A non contrast 2.5mm axial CT study of the brain was performed from skull base to vertex. Coronal and sagittal reformations were generated from the axial data.  COMPARISON EXAMINATION:  No prior    FINDINGS:      There is a diffuse anoxic changes to the brain. There is generalized loss of gray-white differentiation throughout both hemispheres with brain swelling. There is gyral and sulcal effacement with diminution in size of the cisternal and extracerebral spaces. Ventricles are midline.    Similar anoxic changes are also suggested in the brainstem and cerebellumwith decreasing extracerebral spaces and with loss of gray-white differentiation.    There is no obvious hematoma.    The patient is intubated.    IMPRESSION:    1)  diffuse anoxic changes to the brain with generalized cerebral edema and loss of gray-white differentiation. Follow-up MR imaging may be considered for further assessment, along with follow-up CT imaging.  2)  no obvious hemorrhage.    Dr. Hartman was notified at 3:10 PM.    CENTRAL LINE: N  NOBLE: Y  A-LINE: N    GLOBAL ISSUE/BEST PRACTICE:  Analgesia: N  Sedation: Y  CAM-ICU: n/a  HOB elevation: Y  Stress ulcer prophylaxis: Y  VTE prophylaxis: Y  Glycemic control: Y  Nutrition: N    CODE STATUS: FULL CODE

## 2019-12-28 NOTE — PROGRESS NOTE ADULT - SUBJECTIVE AND OBJECTIVE BOX
Subjective: No overnight events.     MEDICATIONS  (STANDING):  ALBUTerol    90 MICROgram(s) HFA Inhaler 2 Puff(s) Inhalation every 6 hours  chlorhexidine 0.12% Liquid 15 milliLiter(s) Oral Mucosa every 12 hours  dextrose 5%. 1000 milliLiter(s) (50 mL/Hr) IV Continuous <Continuous>  dextrose 50% Injectable 12.5 Gram(s) IV Push once  dextrose 50% Injectable 25 Gram(s) IV Push once  dextrose 50% Injectable 25 Gram(s) IV Push once  heparin  Infusion.  Unit(s)/Hr (7.5 mL/Hr) IV Continuous <Continuous>  insulin lispro (HumaLOG) corrective regimen sliding scale   SubCutaneous every 2 hours  lactated ringers. 1000 milliLiter(s) (125 mL/Hr) IV Continuous <Continuous>  magnesium sulfate  IVPB 2 Gram(s) IV Intermittent once  metoprolol tartrate Injectable 5 milliGRAM(s) IV Push every 6 hours  pantoprazole  Injectable 40 milliGRAM(s) IV Push daily  piperacillin/tazobactam IVPB.. 3.375 Gram(s) IV Intermittent every 8 hours  propofol Infusion 5 MICROgram(s)/kG/Min (1.95 mL/Hr) IV Continuous <Continuous>  valproate sodium IVPB 500 milliGRAM(s) IV Intermittent two times a day    MEDICATIONS  (PRN):  dextrose 40% Gel 15 Gram(s) Oral once PRN Blood Glucose LESS THAN 70 milliGRAM(s)/deciliter  fentaNYL    Injectable 25 MICROGram(s) IV Push every 1 hour PRN pain, distress, dyspnea  glucagon  Injectable 1 milliGRAM(s) IntraMuscular once PRN Glucose LESS THAN 70 milligrams/deciliter  heparin  Injectable 3800 Unit(s) IV Push every 6 hours PRN For aPTT less than 40  midazolam Injectable 2 milliGRAM(s) IV Push every 30 minutes PRN distress, dyspnea, anxiety, myoclonus      Allergies    No Known Allergies    Intolerances        Vital Signs Last 24 Hrs  T(C): 35.2 (28 Dec 2019 07:00), Max: 36.6 (27 Dec 2019 20:00)  T(F): 95.4 (28 Dec 2019 07:00), Max: 97.9 (27 Dec 2019 20:00)  HR: 74 (28 Dec 2019 08:00) (63 - 98)  BP: 153/99 (28 Dec 2019 08:00) (64/47 - 165/82)  BP(mean): 114 (28 Dec 2019 08:00) (83 - 114)  RR: 16 (28 Dec 2019 08:00) (16 - 38)  SpO2: 99% (28 Dec 2019 08:00) (93% - 100%)    PHYSICAL EXAM:  GENERAL: Intubated and Sedated   HEAD:  Atraumatic, Normocephalic  ENMT: Moist mucous membranes, Poor Dentition   NECK: Supple, No JVD, Normal thyroid  CHEST/LUNG: Clear to auscultation bilaterally; No rales, rhonchi, wheezing, or rubs  HEART: Regular rate and rhythm; No murmurs, rubs, or gallops  ABDOMEN: Soft, Nontender, Nondistended; Bowel sounds present  EXTREMITIES:  2+ Peripheral Pulses, No clubbing, cyanosis, or edema      LABS:                        11.8   12.72 )-----------( 266      ( 28 Dec 2019 07:05 )             36.3     28 Dec 2019 07:05    142    |  110    |  20     ----------------------------<  130    4.6     |  24     |  1.09     Ca    7.8        28 Dec 2019 07:05  Phos  4.2       28 Dec 2019 07:05  Mg     1.8       28 Dec 2019 07:05    TPro  5.9    /  Alb  2.6    /  TBili  0.4    /  DBili  x      /  AST  424    /  ALT  236    /  AlkPhos  172    28 Dec 2019 07:05    PT/INR - ( 28 Dec 2019 07:05 )   PT: 13.1 sec;   INR: 1.20 ratio         PTT - ( 28 Dec 2019 09:12 )  PTT:82.9 sec  Urinalysis Basic - ( 27 Dec 2019 14:59 )    Color: Yellow / Appearance: Clear / S.010 / pH: x  Gluc: x / Ketone: Negative  / Bili: Negative / Urobili: Negative mg/dL   Blood: x / Protein: Negative mg/dL / Nitrite: Negative   Leuk Esterase: Trace / RBC: x / WBC 3-5   Sq Epi: x / Non Sq Epi: Occasional / Bacteria: Occasional      CAPILLARY BLOOD GLUCOSE      POCT Blood Glucose.: 118 mg/dL (28 Dec 2019 09:04)  POCT Blood Glucose.: 128 mg/dL (28 Dec 2019 07:57)  POCT Blood Glucose.: 119 mg/dL (28 Dec 2019 06:47)  POCT Blood Glucose.: 97 mg/dL (28 Dec 2019 05:40)  POCT Blood Glucose.: 116 mg/dL (28 Dec 2019 04:45)  POCT Blood Glucose.: 116 mg/dL (28 Dec 2019 03:43)  POCT Blood Glucose.: 114 mg/dL (28 Dec 2019 02:50)  POCT Blood Glucose.: 102 mg/dL (28 Dec 2019 01:50)  POCT Blood Glucose.: 107 mg/dL (28 Dec 2019 00:38)  POCT Blood Glucose.: 109 mg/dL (27 Dec 2019 23:52)  POCT Blood Glucose.: 210 mg/dL (27 Dec 2019 14:31)      RADIOLOGY & ADDITIONAL TESTS:    Imaging Personally Reviewed:  [ ] YES     Consultant(s) Notes Reviewed:      Care Discussed with Consultants/Other Providers:    Advanced Directives: [ ] DNR  [ ] No feeding tube  [ ] MOLST in chart  [ ] MOLST completed today  [ ] Unknown Subjective: Overnight started on Heparin infusion due to rising troponins.     MEDICATIONS  (STANDING):  ALBUTerol    90 MICROgram(s) HFA Inhaler 2 Puff(s) Inhalation every 6 hours  chlorhexidine 0.12% Liquid 15 milliLiter(s) Oral Mucosa every 12 hours  dextrose 5%. 1000 milliLiter(s) (50 mL/Hr) IV Continuous <Continuous>  dextrose 50% Injectable 12.5 Gram(s) IV Push once  dextrose 50% Injectable 25 Gram(s) IV Push once  dextrose 50% Injectable 25 Gram(s) IV Push once  heparin  Infusion.  Unit(s)/Hr (7.5 mL/Hr) IV Continuous <Continuous>  insulin lispro (HumaLOG) corrective regimen sliding scale   SubCutaneous every 2 hours  lactated ringers. 1000 milliLiter(s) (125 mL/Hr) IV Continuous <Continuous>  magnesium sulfate  IVPB 2 Gram(s) IV Intermittent once  metoprolol tartrate Injectable 5 milliGRAM(s) IV Push every 6 hours  pantoprazole  Injectable 40 milliGRAM(s) IV Push daily  piperacillin/tazobactam IVPB.. 3.375 Gram(s) IV Intermittent every 8 hours  propofol Infusion 5 MICROgram(s)/kG/Min (1.95 mL/Hr) IV Continuous <Continuous>  valproate sodium IVPB 500 milliGRAM(s) IV Intermittent two times a day    MEDICATIONS  (PRN):  dextrose 40% Gel 15 Gram(s) Oral once PRN Blood Glucose LESS THAN 70 milliGRAM(s)/deciliter  fentaNYL    Injectable 25 MICROGram(s) IV Push every 1 hour PRN pain, distress, dyspnea  glucagon  Injectable 1 milliGRAM(s) IntraMuscular once PRN Glucose LESS THAN 70 milligrams/deciliter  heparin  Injectable 3800 Unit(s) IV Push every 6 hours PRN For aPTT less than 40  midazolam Injectable 2 milliGRAM(s) IV Push every 30 minutes PRN distress, dyspnea, anxiety, myoclonus      Allergies    No Known Allergies    Intolerances        Vital Signs Last 24 Hrs  T(C): 35.2 (28 Dec 2019 07:00), Max: 36.6 (27 Dec 2019 20:00)  T(F): 95.4 (28 Dec 2019 07:00), Max: 97.9 (27 Dec 2019 20:00)  HR: 74 (28 Dec 2019 08:00) (63 - 98)  BP: 153/99 (28 Dec 2019 08:00) (64/47 - 165/82)  BP(mean): 114 (28 Dec 2019 08:00) (83 - 114)  RR: 16 (28 Dec 2019 08:00) (16 - 38)  SpO2: 99% (28 Dec 2019 08:00) (93% - 100%)    PHYSICAL EXAM:  GENERAL: Intubated and Sedated   HEAD:  Atraumatic, Normocephalic  ENMT: Moist mucous membranes, Poor Dentition   NECK: Supple, No JVD, Normal thyroid  CHEST/LUNG: Clear to auscultation bilaterally; No rales, rhonchi, wheezing, or rubs  HEART: Regular rate and rhythm; No murmurs, rubs, or gallops  ABDOMEN: Soft, Nontender, Nondistended; Bowel sounds present  EXTREMITIES:  2+ Peripheral Pulses, No clubbing, cyanosis, or edema      LABS:                        11.8   12.72 )-----------( 266      ( 28 Dec 2019 07:05 )             36.3     28 Dec 2019 07:05    142    |  110    |  20     ----------------------------<  130    4.6     |  24     |  1.09     Ca    7.8        28 Dec 2019 07:05  Phos  4.2       28 Dec 2019 07:05  Mg     1.8       28 Dec 2019 07:05    TPro  5.9    /  Alb  2.6    /  TBili  0.4    /  DBili  x      /  AST  424    /  ALT  236    /  AlkPhos  172    28 Dec 2019 07:05    PT/INR - ( 28 Dec 2019 07:05 )   PT: 13.1 sec;   INR: 1.20 ratio         PTT - ( 28 Dec 2019 09:12 )  PTT:82.9 sec  Urinalysis Basic - ( 27 Dec 2019 14:59 )    Color: Yellow / Appearance: Clear / S.010 / pH: x  Gluc: x / Ketone: Negative  / Bili: Negative / Urobili: Negative mg/dL   Blood: x / Protein: Negative mg/dL / Nitrite: Negative   Leuk Esterase: Trace / RBC: x / WBC 3-5   Sq Epi: x / Non Sq Epi: Occasional / Bacteria: Occasional      CAPILLARY BLOOD GLUCOSE      POCT Blood Glucose.: 118 mg/dL (28 Dec 2019 09:04)  POCT Blood Glucose.: 128 mg/dL (28 Dec 2019 07:57)  POCT Blood Glucose.: 119 mg/dL (28 Dec 2019 06:47)  POCT Blood Glucose.: 97 mg/dL (28 Dec 2019 05:40)  POCT Blood Glucose.: 116 mg/dL (28 Dec 2019 04:45)  POCT Blood Glucose.: 116 mg/dL (28 Dec 2019 03:43)  POCT Blood Glucose.: 114 mg/dL (28 Dec 2019 02:50)  POCT Blood Glucose.: 102 mg/dL (28 Dec 2019 01:50)  POCT Blood Glucose.: 107 mg/dL (28 Dec 2019 00:38)  POCT Blood Glucose.: 109 mg/dL (27 Dec 2019 23:52)  POCT Blood Glucose.: 210 mg/dL (27 Dec 2019 14:31)      RADIOLOGY & ADDITIONAL TESTS:    Imaging Personally Reviewed:  [ ] YES     Consultant(s) Notes Reviewed:      Care Discussed with Consultants/Other Providers:    Advanced Directives: [ ] DNR  [ ] No feeding tube  [ ] MOLST in chart  [ ] MOLST completed today  [ ] Unknown

## 2019-12-29 NOTE — DIETITIAN INITIAL EVALUATION ADULT. - PERTINENT MEDS FT
heparin, zosyn IVPB, dextrose 5% + lactose ringers @ 75 ml/hr, humalog, versed, pantoprazole injectable, propofol infusion, fentanyl injectable, norepinephrine infusion, valproate sodium IVPB

## 2019-12-29 NOTE — PROGRESS NOTE ADULT - ASSESSMENT
58 y/o F w/ pmh of opioid abuse, depression, ovarian ca, presented to [facility] earlier today in cardiac arrest. Pt unable to provide hx, no family present. Per EMS pt was reported having been talking to friends when she suddenly had a seizure and then stopped breathing, per EMS friends immediately called 911 and EMS found pt in vfib. ACLS protocol was started pt received x4 epi and 2 shock and ROSC was achieve while in route to the ED pt arrest again and came into the trauma bay in cardiac arrest. ROSC was achieved again during 1st pulse checked. Pt now admitted to the [facility] for cardiac arrest.    -Neuro: Diffuse anxious brain injury noted on CTH, pt now with likely brain herniation given exam finding, pt awaiting neurology follow up today, pt will likely undergo an terminal extubation  -Cardiac: s/p cardiac arrest now s/p hypothermia protocol, maintain core temp at 36, NSTEMI cont heparin drip, Shock likely 2/2 to brain herniation cont levophed to maintain MAP >90  -Resp: Cont lung protective ventilation, titrate fio2 down to 21% if possible and maintain o2 >92%, cont propofol for vent synchrony   -GI: NPO for now, GI ppx w/ protonix   -Renal: LORNE improved, cont to monitor renal function and lyntes closely, replete w/ renal precautions, monitor I&O closely  -ID: Possible asp event cont IV zosyn for now, monitor Wbc/temp for now, LA at 13 now resolved, urine/blood cx neg so far   -Endo: No acute process, FS trend noted will switched IVF from LR---->LR w/ D5 and change FS from q2h--->q4h  -Heme: DVT ppx w/ heparin for now  -Dispo: Admit to ICU, pt w/ critical illness, giving CTH finding prognosis is poor and now exam concerning for brain herniation, there is a possibility that cardiac arrest could have been drug OD on amphetamine?, d/w at length with  regarding poor prognosis and understand the circumstance and requesting to withdraw care pt awaiting neurology for brain death exam, case d/w, cardiology, neurology, hospitalize dr. arredondo and [facility]

## 2019-12-29 NOTE — PROGRESS NOTE ADULT - PROBLEM SELECTOR PLAN 1
card arrest - anoxic brain injury - resp failure - fixed and dilated pupils -   vent support  BP labile - pressors - keep MAP > 60  I and O  IVF  prognosis is extremely poor  completed 24 hrs of TTM -   labs and imaging reviewed  discussed with family - poor prognosis   palliative care and end of life care rec.   will follow

## 2019-12-29 NOTE — CHART NOTE - NSCHARTNOTEFT_GEN_A_CORE
Spoke with  (Loree - [phone]) regarding MOLST/DNR. He signed a do not resuscitate on the MOLST. States that he had discussion with family and this was all agreed upon. States that if her heart stops they would not want additional compressions to restart her heart. Wants to keep endotracheal tube in for now as per family wishes.     MOLST signed. Patient is DNR. Planning for terminal extubation on 1/1/2020. Patient is critically ill at risk for abrupt decompensation and death. Family is aware.

## 2019-12-29 NOTE — PROGRESS NOTE ADULT - ASSESSMENT
59F with PMHx as above, admitted to SPCU s/p Vfib arrest, acute hypoxemic and hypercapnic respiratory failure, LORNE with hypokalemia and hypomagnesemia, severe metabolic aciodosis, shock, NSTEMI, anoxic brain injury, PMVT.    Neuro   -exam and radiology images c/w anoxic brain injury   -seizure likely 2/2 above   -Neuro f/u appreciated  -Prognosis grave. No expectations of any meaningful recovery  -awaiting family decision regarding terminal extubation    CV  -cardiogenic shock   -titrate levophed drip to keep MAP>60  -cont heparin drip, keep PTT 50-70 59F with PMHx as above, admitted to SPCU s/p Vfib arrest, acute hypoxemic and hypercapnic respiratory failure, LORNE with hypokalemia and hypomagnesemia, severe metabolic aciodosis, shock, NSTEMI, anoxic brain injury, PMVT.    Neuro   -exam and radiology images c/w anoxic brain injury   -seizure likely 2/2 above   -Neuro f/u appreciated  -Prognosis grave. No expectations of any meaningful recovery  -awaiting family decision regarding terminal extubation    CV  -s/p cardiac arrest, s/p hypothermia protocol, cardiogenic/septic shock   -titrate levophed drip to keep MAP>90  -Trops trending down   -cont heparin drip, keep PTT 50-70  -Cardio following    Pulm  -cont vent support w/ lung protective setting  -titrate to keep O2S>93%  -not a weaning candidate  -likely [plan terminal wean. Family to decide     GI  -NPO  -GI ppx     Renal  -s/p LORNE, Cr resolved   -monitor lytes, keep K>4, Mg>2  -monitor I/O's     ID  -likely aspiration pneumonia   -cont Zosyn   -F/u Bcx's     ICU  Prognosis is poor.   Pt now DNR        32 min encounter (Reviewing data, imaging, discussing with multidisciplinary team, non inclusive of procedures, discussing goals of care with patient/family)

## 2019-12-29 NOTE — PROGRESS NOTE ADULT - SUBJECTIVE AND OBJECTIVE BOX
60 y/o F w/ pmh of opioid abuse, depression, ovarian ca, presented to [facility] earlier today in cardiac arrest. Pt unable to provide hx, no family present. Per EMS pt was reported having been talking to friends when she suddenly had a seizure and then stopped breathing, per EMS friends immediately called 911 and EMS found pt in vfib. ACLS protocol was started pt received x4 epi and 2 shock and ROSC was achieve while in route to the ED pt arrest again and came into the trauma bay in cardiac arrest. ROSC was achieved again during 1st pulse checked. During my evaluation in the ED pt was bradycardic 40-60s, SBP 60 for which she was given 3L of wide open w/ some improvement in fluids. Pt underwent PAN-SCAN to r/o any acute brain/cervical/chest/abd/pelvis pathology and admitted to the ICU for further tx and evaluation. No further information available at this time. Pt hospital course complicated by NSTEMI w/ +trops Tmax of 28 for which she was started on heparin drip that are down trending down.      24 hour events: Overnight pt continued to have high blood pressures and then suddenly became hypotensive and bradycardia, pt exam is now concerning for brain herniation. Pt now has lost corneal reflux no longer overbreathing the went as she was previously. No other major overnight events.    Review of Systems: Unable to obtain ROS     T(F): 97.9 (19 @ 08:00), Max: 98.8 (19 @ 06:00)  HR: 73 (19 @ 10:00) (58 - 133)  BP: 132/76 (19 @ 10:00) (86/63 - 194/97)  RR: 16 (19 @ 10:00) (13 - 41)  SpO2: 97% (19 @ 10:00) (91% - 99%)  Wt(kg): --    Mode: AC/ CMV (Assist Control/ Continuous Mandatory Ventilation), RR (machine): 16, TV (machine): 400, FiO2: 35, PEEP: 5  19 @ 07:01  -  19 @ 07:00  --------------------------------------------------------  IN: 3690.7 mL / OUT: 1545 mL / NET: 2145.7 mL    19 @ 07:01  -  19 @ 12:25  --------------------------------------------------------  IN: 45.5 mL / OUT: 0 mL / NET: 45.5 mL        CAPILLARY BLOOD GLUCOSE      POCT Blood Glucose.: 103 mg/dL (29 Dec 2019 09:54)      I&O's Summary    28 Dec 2019 07:  -  29 Dec 2019 07:00  --------------------------------------------------------  IN: 3690.7 mL / OUT: 1545 mL / NET: 2145.7 mL    29 Dec 2019 07:  -  29 Dec 2019 12:25  --------------------------------------------------------  IN: 45.5 mL / OUT: 0 mL / NET: 45.5 mL        Physical Exam:   Neuro: obtunded, no gag reflux noted, not overbreathing the vent, no responds to verbal/tactile or painful stimuli   HEENT: pupils fixed and dilated b/l, +R. EJ line placed  Resp: Good air entry b/l respiration synchronized with the vent  CVS: +RRR  Abd: BSx4, soft, nt/nd  Ext: no edema  Skin: warm/dry      Meds:  piperacillin/tazobactam IVPB.. IV Intermittent    norepinephrine Infusion IV Continuous    dextrose 40% Gel Oral PRN  dextrose 50% Injectable IV Push  dextrose 50% Injectable IV Push  dextrose 50% Injectable IV Push  glucagon  Injectable IntraMuscular PRN  insulin lispro (HumaLOG) corrective regimen sliding scale SubCutaneous    ALBUTerol    90 MICROgram(s) HFA Inhaler Inhalation    fentaNYL    Injectable IV Push PRN  midazolam Injectable IV Push PRN  propofol Infusion IV Continuous  valproate sodium IVPB IV Intermittent      heparin  Infusion. IV Continuous  heparin  Injectable IV Push PRN    pantoprazole  Injectable IV Push      dextrose 5% + lactated ringers. IV Continuous  dextrose 5%. IV Continuous      chlorhexidine 0.12% Liquid Oral Mucosa                              10.7   15.51 )-----------( 322      ( 29 Dec 2019 06:09 )             32.7           141  |  108  |  19  ----------------------------<  108<H>  3.8   |  25  |  1.13    Ca    8.1<L>      29 Dec 2019 06:09  Phos  3.2       Mg     2.3         TPro  5.1<L>  /  Alb  2.3<L>  /  TBili  0.3  /  DBili  x   /  AST  200<H>  /  ALT  161<H>  /  AlkPhos  131<H>      Lactate 3.9            @ 19:09      CARDIAC MARKERS ( 29 Dec 2019 06:09 )  4.294 ng/mL / x     / x     / x     / x      CARDIAC MARKERS ( 28 Dec 2019 12:09 )  11.050 ng/mL / x     / x     / x     / x      CARDIAC MARKERS ( 28 Dec 2019 08:23 )  16.133 ng/mL / x     / x     / x     / x      CARDIAC MARKERS ( 28 Dec 2019 02:21 )  28.920 ng/mL / x     / x     / x     / x      CARDIAC MARKERS ( 27 Dec 2019 19:01 )  19.278 ng/mL / x     / x     / x     / x      CARDIAC MARKERS ( 27 Dec 2019 14:55 )  .394 ng/mL / x     / x     / x     / x          PT/INR - ( 28 Dec 2019 19:09 )   PT: 13.1 sec;   INR: 1.20 ratio         PTT - ( 29 Dec 2019 06:11 )  PTT:43.1 sec  Urinalysis Basic - ( 27 Dec 2019 14:59 )    Color: Yellow / Appearance: Clear / S.010 / pH: x  Gluc: x / Ketone: Negative  / Bili: Negative / Urobili: Negative mg/dL   Blood: x / Protein: Negative mg/dL / Nitrite: Negative   Leuk Esterase: Trace / RBC: x / WBC 3-5   Sq Epi: x / Non Sq Epi: Occasional / Bacteria: Occasional      .Blood Blood-Venous   No growth to date. --  @ 22:05  .Urine Clean Catch (Midstream)   No growth --  @ 21:52      Radiology: 58 y/o F w/ pmh of opioid abuse, depression, ovarian ca, presented to [facility] earlier today in cardiac arrest. Pt unable to provide hx, no family present. Per EMS pt was reported having been talking to friends when she suddenly had a seizure and then stopped breathing, per EMS friends immediately called 911 and EMS found pt in vfib. ACLS protocol was started pt received x4 epi and 2 shock and ROSC was achieve while in route to the ED pt arrest again and came into the trauma bay in cardiac arrest. ROSC was achieved again during 1st pulse checked. During my evaluation in the ED pt was bradycardic 40-60s, SBP 60 for which she was given 3L of wide open w/ some improvement in fluids. Pt underwent PAN-SCAN to r/o any acute brain/cervical/chest/abd/pelvis pathology and admitted to the ICU for further tx and evaluation. No further information available at this time. Pt hospital course complicated by NSTEMI w/ +trops Tmax of 28 for which she was started on heparin drip that are down trending down.      24 hour events: Overnight pt continued to have high blood pressures and then suddenly became hypotensive and bradycardia, pt exam is now concerning for brain herniation. Pt now has lost corneal reflux no longer overbreathing the went as she was previously. No other major overnight events.    Review of Systems: Unable to obtain ROS     T(F): 97.9 (19 @ 08:00), Max: 98.8 (19 @ 06:00)  HR: 73 (19 @ 10:00) (58 - 133)  BP: 132/76 (19 @ 10:00) (86/63 - 194/97)  RR: 16 (19 @ 10:00) (13 - 41)  SpO2: 97% (19 @ 10:00) (91% - 99%)  Wt(kg): --    Mode: AC/ CMV (Assist Control/ Continuous Mandatory Ventilation), RR (machine): 16, TV (machine): 400, FiO2: 35, PEEP: 5  19 @ 07:01  -  19 @ 07:00  --------------------------------------------------------  IN: 3690.7 mL / OUT: 1545 mL / NET: 2145.7 mL    19 @ 07:01  -  19 @ 12:25  --------------------------------------------------------  IN: 45.5 mL / OUT: 0 mL / NET: 45.5 mL        CAPILLARY BLOOD GLUCOSE      POCT Blood Glucose.: 103 mg/dL (29 Dec 2019 09:54)      I&O's Summary    28 Dec 2019 07:  -  29 Dec 2019 07:00  --------------------------------------------------------  IN: 3690.7 mL / OUT: 1545 mL / NET: 2145.7 mL    29 Dec 2019 07:  -  29 Dec 2019 12:25  --------------------------------------------------------  IN: 45.5 mL / OUT: 0 mL / NET: 45.5 mL        Physical Exam:   Neuro: obtunded, no gag reflux noted, not overbreathing the vent, no responds to verbal/tactile or painful stimuli   HEENT: pupils fixed and dilated b/l, +R. EJ line placed  Resp: Good air entry b/l respiration synchronized with the vent  CVS: +RRR  Abd: BSx4, soft, nt/nd  Ext: no edema  Skin: warm/dry      Meds:  piperacillin/tazobactam IVPB.. IV Intermittent    norepinephrine Infusion IV Continuous    dextrose 40% Gel Oral PRN  dextrose 50% Injectable IV Push  dextrose 50% Injectable IV Push  dextrose 50% Injectable IV Push  glucagon  Injectable IntraMuscular PRN  insulin lispro (HumaLOG) corrective regimen sliding scale SubCutaneous    ALBUTerol    90 MICROgram(s) HFA Inhaler Inhalation    fentaNYL    Injectable IV Push PRN  midazolam Injectable IV Push PRN  propofol Infusion IV Continuous  valproate sodium IVPB IV Intermittent      heparin  Infusion. IV Continuous  heparin  Injectable IV Push PRN    pantoprazole  Injectable IV Push      dextrose 5% + lactated ringers. IV Continuous  dextrose 5%. IV Continuous      chlorhexidine 0.12% Liquid Oral Mucosa                              10.7   15.51 )-----------( 322      ( 29 Dec 2019 06:09 )             32.7           141  |  108  |  19  ----------------------------<  108<H>  3.8   |  25  |  1.13    Ca    8.1<L>      29 Dec 2019 06:09  Phos  3.2       Mg     2.3         TPro  5.1<L>  /  Alb  2.3<L>  /  TBili  0.3  /  DBili  x   /  AST  200<H>  /  ALT  161<H>  /  AlkPhos  131<H>      Lactate 3.9            @ 19:09      CARDIAC MARKERS ( 29 Dec 2019 06:09 )  4.294 ng/mL / x     / x     / x     / x      CARDIAC MARKERS ( 28 Dec 2019 12:09 )  11.050 ng/mL / x     / x     / x     / x      CARDIAC MARKERS ( 28 Dec 2019 08:23 )  16.133 ng/mL / x     / x     / x     / x      CARDIAC MARKERS ( 28 Dec 2019 02:21 )  28.920 ng/mL / x     / x     / x     / x      CARDIAC MARKERS ( 27 Dec 2019 19:01 )  19.278 ng/mL / x     / x     / x     / x      CARDIAC MARKERS ( 27 Dec 2019 14:55 )  .394 ng/mL / x     / x     / x     / x          PT/INR - ( 28 Dec 2019 19:09 )   PT: 13.1 sec;   INR: 1.20 ratio         PTT - ( 29 Dec 2019 06:11 )  PTT:43.1 sec  Urinalysis Basic - ( 27 Dec 2019 14:59 )    Color: Yellow / Appearance: Clear / S.010 / pH: x  Gluc: x / Ketone: Negative  / Bili: Negative / Urobili: Negative mg/dL   Blood: x / Protein: Negative mg/dL / Nitrite: Negative   Leuk Esterase: Trace / RBC: x / WBC 3-5   Sq Epi: x / Non Sq Epi: Occasional / Bacteria: Occasional      .Blood Blood-Venous   No growth to date. --  @ 22:05  .Urine Clean Catch (Midstream)   No growth --  @ 21:52      Radiology:     < from: US Duplex Venous Lower Ext Complete, Bilateral (19 @ 22:17) >  EXAM:  US DPLX LWR EXT VEINS COMPL BI                                  PROCEDURE DATE:  2019          INTERPRETATION:  [facility] RADIOLOGIST PRELIMINARY REPORT - [facility] Radiologist Dr. Juan Aleman    PROCEDURE INFORMATION:   Exam: US Duplex Lower Extremity Veins   Exam date and time: 2019 9:24 PM   Age: 59 years old   Clinical indication: Other: Cardiac arrest     TECHNIQUE:   Imaging protocol: Real-time duplex ultrasound of the Lower Extremities with 2-D   gray scale, color Doppler flow and spectral waveform analysis with image   documentation. Complete exam focused on the bilateral lower extremity veins.     COMPARISON:   No relevant prior studies available.     FINDINGS:   Right deep veins: Unremarkable. The common femoral, femoral, proximal profunda   femoral and popliteal veins are patent without thrombus. Normal Doppler   waveforms. Normal compressibility and/or augmentation response.    Right superficial veins: Saphenofemoral junction is patent without thrombus.     Leftdeep veins: Unremarkable. The common femoral, femoral, proximal profunda   femoral and popliteal veins are patent without thrombus. Normal Doppler   waveforms. Normal compressibility and/or augmentation response.    Left superficial veins: Saphenofemoral junction is patent without thrombus.     Soft tissues: Unremarkable.     IMPRESSION:   No acute findings. No evidence of deep vein thrombosis.    AGREE WITH THE ABOVE FINDINGS AND REPORT          DIMITRI SOARES M.D., ATTENDING RADIOLOGIST  Thisdocument has been electronically signed. Dec 28 2019  8:33AM    < end of copied text >    EXAM:  XR CHEST PORTABLE IMMED 1V                                  PROCEDURE DATE:  2019          INTERPRETATION:  Clinical information: NG tube placement.    Technique: Frontal view of the lower chest and upper abdomen.    Comparison: None available.    Findings: The patient is status post NG tube placement with the NG tube tip approximately 3 cm below the left hemidiaphragm. The NG tube can be advanced for more optimal positioning.    Evaluation of the lungs, heart, and abdominal cavityis limited on this portable study.    IMPRESSION: Status post placement of an NG tube with the tip approximately 3 cm below the left hemidiaphragm. The NG tube can be advanced for more optimal positioning.          DIMITRI SOARES M.D., ATTENDING RADIOLOGIST  This document has been electronically signed. Dec 28 2019  8:23AM      < from: US Transthoracic Echocardiogram w/Doppler Complete (19 @ 17:43) >  EXAM:  US TTE W DOPPLER COMPLETE                                  PROCEDURE DATE:  2019          INTERPRETATION:  Ordering Physician: CONNIE MARTIN [id number]    Indication: Cardiac arrest    Technician: Amos Gonzalez    Study Quality: Fair   A complete transthoracic echocardiographic study was performed utilizing standard protocol including spectral and color Doppler in all echocardiographic windows.    Height: 68 inches  Weight: 59 kg  BSA: 1.7 sq m  Blood Pressure: 150/79 mmHg    MEASUREMENTS(CM)  IVS: 1.1  PWT: 1.0  LA: 3.0  AO: 3.0  LVIDd: 4.7  LVIDs: 3.5  LVOT: 1.9    LVEF: 55-60%  RVSP: 5 0 mmHg  RA Pressure: 3 mmHg  IVC: Less than 2 cm and respiratory variation present    FINDINGS  Left Ventricle: The left ventricle is normal in size with overall normal LV systolic function. No focal wall motion abnormalities are seen. The approximate ejection fraction is 55-60%.  Right Ventricle: The right ventricle is normal in size and systolic function.  Left Atrium: The left atrium is normal in size  Right Atrium: The right atrium is normal in size  Mitral Valve: The mitral valve is minimally thickened with normal leaflet excursion. Trace insufficiency and no stenosis is seen.  Aortic Valve: The aortic valve is minimally thickened with normal leaflet excursion. Trace insufficiency and no stenosis is noted.  Tricuspid Valve: The tricuspid valve is normal in thickness and leaflet excursion. Mild insufficiency and no stenosis is seen. The estimated right ventricular systolic pressure is approximately 50 mmHg. Consistent with moderate pulmonary hypertension  Pulmonic Valve: The pulmonic valve is poorly visualized but appears appropriate in thickness and leaflet excursion. Valvular function is within normal limits.  Diastolic Function: Within normal limits  Pericardium/Pleura: The pericardium is within normal limits. No pericardial effusion is seen. No pleural effusion is noted.      CONCLUSIONS:  Normal left ventricular function with an ejection fraction of approximately 55-60%.  Mild tricuspid insufficiency with moderate pulmonary hypertension      PREETHI RUBY M.D., ATTENDING CARDIOLOGIST  This document has been electronically signed. Dec 29 2019  6:37AM    < end of copied text >    CENTRAL LINE: N  NOBLE: Y  A-LINE: Y    GLOBAL ISSUE/BEST PRACTICE:  Analgesia: N  Sedation: Y  CAM-ICU: n/a  HOB elevation: Y  Stress ulcer prophylaxis: Y  VTE prophylaxis: Y  Glycemic control: Y  Nutrition: N    CODE STATUS: FULL CODE

## 2019-12-29 NOTE — DIETITIAN INITIAL EVALUATION ADULT. - PERTINENT LABORATORY DATA
Hgb 10.7 L, Hct 32.7 L, POCT glucose 120 H, Na 141 wnl, K 3.8 wnl, Cl 108 wnl, CO2 25 wnl, BUN 19 wnl, Creat 1.13 wnl, Glu 108 H, Alb 2.3 L, eGFR 54 L

## 2019-12-29 NOTE — PROGRESS NOTE ADULT - ASSESSMENT
The patient is a 59 year old female with unknown past medical history who presents with cardiac arrest.    Plan:  - Urine tox noted - positive for methamphetamine, opiates, and methadone  - Troponin peaked at 28 and trending down. Cannot exclude type 1 NSTEMI, but more likely enzymes are elevated due to arrest and poor cardiac perfusion at that time.  - Continue heparin drip for duration of 48 hours as medical treatment for NSTEMI. If there are signs of bleeding, stop drip.  - CT head with no bleed, but anoxic brain injury present  - CT chest with multifocal PNA likely aspiration  - Ventricular ectopy on telemetry. Start metoprolol 5 mg IV q6h. If patient having runs of VT, would start amiodarone bolus and drip  - Echocardiogram with grossly normal LV systolic function  - Noted to have a brief run of PMVT on telemetry in the setting of long QT and R on T. Calcium and magnesium given.  - Metoprolol discontinued. Avoid bradycardia. If HR goes down or if recurrent PMVT, will need to be started on lidocaine drip and/or transvenous pacemaker for overdrive pacing.  - Mechanical ventilation  - Concern for brain herniation. Neurology follow-up.  - Overall prognosis poor. Family may opt for terminal extubation.

## 2019-12-29 NOTE — DIETITIAN INITIAL EVALUATION ADULT. - OTHER INFO
58 y/o pmhx of opioid abuse, depression, ovarian ca, presented to [facility] earlier today in cardiac arrest. Pt unable to provide hx, no family present. Per EMS pt was reported having been talking to friends when she suddenly had a seizure and then stopped breathing, per EMS friends immediately called 911 and EMS found pt in vfib. ACLS protocol was started pt received x4 epi and 2 shock and ROSC was achieve while in route to the ED pt arrest again and came into the trauma bay in cardiac arrest. ROSC was achieved again during 1st pulse checked. . Pt underwent PAN-SCAN to r/o any acute brain/cervical/chest/abd/pelvis pathology and admitted to the ICU for further tx and evaluation. Pt hospital course complicated by NSTEMI w/ +trops Tmax of 28 for which she was started on heparin drip that are down trending down. Pt continued to have high blood pressures, became hypotensive, bradycardic, pt now concerning for brain herniation. Per MD note, terminal extubation planned, major suspicion for brain herniation and progosis is very poor, suspect brain death w/ fixed and dilated pupils. Discussed prognosis w/ PA, plan is for terminal extubation. Pt is NPO, not a candidate for NGT tube. On Dextrose 5% + lactose ringers @ 75 ml/hr, sedated on propofol infusion. Pt unable to provide diet/weight hx. Unable to perform full NFPE at this time. Noted w/ 2+ mild generalized edema, 3+ moderate edema periorbital region. Skin intact of PU; chest abrasion noted per flowsheets. Last BM 12/29. Will follow clinical course.

## 2019-12-29 NOTE — PROGRESS NOTE ADULT - ASSESSMENT
60 y/o F w/ pmh of opioid abuse, depression, ovarian ca, presented to [facility] earlier today in cardiac arrest. Pt unable to provide hx, no family present. Per EMS pt was reported having been talking to friends when she suddenly had a seizure and then stopped breathing, per EMS friends immediately called 911 and EMS found pt in vfib. ACLS protocol was started pt received x4 epi and 2 shock and ROSC was achieve while in route to the ED pt arrest again and came into the trauma bay in cardiac arrest. ROSC was achieved again during 1st pulse checked. Pt now admitted to the SPCU for cardiac arrest.    Cardiac Arrest   Etiology unknown but suspect Methamphetiamine OD   Heparin infusion + Metoprolol IV Q6H   Cardiology on board  Continue TTM and MAP >90  Pressors PRN; Intubated and Sedated with Propofol   Suspect brain death with fixed and dilated pupils   Possible terminal extubation   CC: Dr. Ramírez; Cardiology: Dr. Corley    Acute Respiratory Failure   Possible Aspiration; IV Zosyn;  Nebulizers PRN   Inutabted and Sedated   Follow up cultures       Transaminitis   Most likely from shock with possible underlying liver disease   Will maintain MAP and trend for now    Diet  NPO    DVT Prophylaxis  Heparin SC TID     Disposition  Terminal extubation possible later today after family meeting  There is major suspicion for brain herniation and prognosis is very poor

## 2019-12-29 NOTE — PROGRESS NOTE ADULT - SUBJECTIVE AND OBJECTIVE BOX
Chief Complaint: Cardiac arrest    Interval Events: Brief episode of torsades yesterday. Worsening neurologic status today.    Review of Systems:  General: No fevers, chills, weight loss or gain  Skin: No rashes, color changes  Cardiovascular: No chest pain, orthopnea  Respiratory: No shortness of breath, cough  Gastrointestinal: No nausea, abdominal pain  Genitourinary: No incontinence, pain with urination  Musculoskeletal: No pain, swelling, decreased range of motion  Neurological: No headache, weakness  Psychiatric: No depression, anxiety  Endocrine: No weight loss or gain, increased thirst  All other systems are comprehensively negative.    Physical Exam:  Vital Signs Last 24 Hrs  T(C): 37.1 (29 Dec 2019 06:00), Max: 37.1 (29 Dec 2019 06:00)  T(F): 98.8 (29 Dec 2019 06:00), Max: 98.8 (29 Dec 2019 06:00)  HR: 82 (29 Dec 2019 08:15) (56 - 133)  BP: 136/80 (29 Dec 2019 08:15) (86/63 - 194/97)  BP(mean): 95 (29 Dec 2019 08:15) (61 - 132)  RR: 16 (29 Dec 2019 08:15) (13 - 41)  SpO2: 97% (29 Dec 2019 08:15) (91% - 99%)  General: Intubated  HEENT: ET tube in place  Neck: No JVD, no carotid bruit  Lungs: Coarse bilaterally  CV: RRR, nl S1/S2, no M/R/G  Abdomen: S/NT/ND, +BS  Extremities: No LE edema, no cyanosis  Neuro: Sedated  Skin: No rash    Labs:             12-29    141  |  108  |  19  ----------------------------<  108<H>  3.8   |  25  |  1.13    Ca    8.1<L>      29 Dec 2019 06:09  Phos  3.2     12-29  Mg     2.3     12-29    TPro  5.1<L>  /  Alb  2.3<L>  /  TBili  0.3  /  DBili  x   /  AST  200<H>  /  ALT  161<H>  /  AlkPhos  131<H>  12-29                        10.7   15.51 )-----------( 322      ( 29 Dec 2019 06:09 )             32.7       Telemetry: Sinus rhythm, PVCs, brief episode of PMVT Chief Complaint: Cardiac arrest    Interval Events: Brief episode of torsades yesterday. Worsening neurologic status today.    Review of Systems:  Unable to obtain    Physical Exam:  Vital Signs Last 24 Hrs  T(C): 37.1 (29 Dec 2019 06:00), Max: 37.1 (29 Dec 2019 06:00)  T(F): 98.8 (29 Dec 2019 06:00), Max: 98.8 (29 Dec 2019 06:00)  HR: 82 (29 Dec 2019 08:15) (56 - 133)  BP: 136/80 (29 Dec 2019 08:15) (86/63 - 194/97)  BP(mean): 95 (29 Dec 2019 08:15) (61 - 132)  RR: 16 (29 Dec 2019 08:15) (13 - 41)  SpO2: 97% (29 Dec 2019 08:15) (91% - 99%)  General: Intubated  HEENT: ET tube in place  Neck: No JVD, no carotid bruit  Lungs: Coarse bilaterally  CV: RRR, nl S1/S2, no M/R/G  Abdomen: S/NT/ND, +BS  Extremities: No LE edema, no cyanosis  Neuro: Sedated  Skin: No rash    Labs:             12-29    141  |  108  |  19  ----------------------------<  108<H>  3.8   |  25  |  1.13    Ca    8.1<L>      29 Dec 2019 06:09  Phos  3.2     12-29  Mg     2.3     12-29    TPro  5.1<L>  /  Alb  2.3<L>  /  TBili  0.3  /  DBili  x   /  AST  200<H>  /  ALT  161<H>  /  AlkPhos  131<H>  12-29                        10.7   15.51 )-----------( 322      ( 29 Dec 2019 06:09 )             32.7       Telemetry: Sinus rhythm, PVCs, brief episode of PMVT

## 2019-12-29 NOTE — PROGRESS NOTE ADULT - SUBJECTIVE AND OBJECTIVE BOX
Subjective: Overnight with brain herniation.     MEDICATIONS  (STANDING):  ALBUTerol    90 MICROgram(s) HFA Inhaler 2 Puff(s) Inhalation every 6 hours  chlorhexidine 0.12% Liquid 15 milliLiter(s) Oral Mucosa every 12 hours  dextrose 5% + lactated ringers. 1000 milliLiter(s) (75 mL/Hr) IV Continuous <Continuous>  dextrose 5%. 1000 milliLiter(s) (50 mL/Hr) IV Continuous <Continuous>  dextrose 50% Injectable 12.5 Gram(s) IV Push once  dextrose 50% Injectable 25 Gram(s) IV Push once  dextrose 50% Injectable 25 Gram(s) IV Push once  heparin  Infusion.  Unit(s)/Hr (7.5 mL/Hr) IV Continuous <Continuous>  insulin lispro (HumaLOG) corrective regimen sliding scale   SubCutaneous every 2 hours  norepinephrine Infusion 0.05 MICROgram(s)/kG/Min (6.094 mL/Hr) IV Continuous <Continuous>  pantoprazole  Injectable 40 milliGRAM(s) IV Push daily  piperacillin/tazobactam IVPB.. 3.375 Gram(s) IV Intermittent every 8 hours  propofol Infusion 5 MICROgram(s)/kG/Min (1.95 mL/Hr) IV Continuous <Continuous>  valproate sodium IVPB 500 milliGRAM(s) IV Intermittent two times a day    MEDICATIONS  (PRN):  dextrose 40% Gel 15 Gram(s) Oral once PRN Blood Glucose LESS THAN 70 milliGRAM(s)/deciliter  fentaNYL    Injectable 50 MICROGram(s) IV Push every 1 hour PRN agiation, distress  glucagon  Injectable 1 milliGRAM(s) IntraMuscular once PRN Glucose LESS THAN 70 milligrams/deciliter  heparin  Injectable 3800 Unit(s) IV Push every 6 hours PRN For aPTT less than 40  midazolam Injectable 2 milliGRAM(s) IV Push every 30 minutes PRN distress, dyspnea, anxiety, myoclonus      Allergies    No Known Allergies    Intolerances        Vital Signs Last 24 Hrs  T(C): 36.6 (29 Dec 2019 08:00), Max: 37.1 (29 Dec 2019 06:00)  T(F): 97.9 (29 Dec 2019 08:00), Max: 98.8 (29 Dec 2019 06:00)  HR: 73 (29 Dec 2019 10:00) (56 - 133)  BP: 132/76 (29 Dec 2019 10:00) (86/63 - 194/97)  BP(mean): 92 (29 Dec 2019 10:00) (61 - 132)  RR: 16 (29 Dec 2019 10:00) (13 - 41)  SpO2: 97% (29 Dec 2019 10:00) (91% - 99%)    PHYSICAL EXAM:  GENERAL: Intubated and non responsive   HEAD:  Atraumatic, Normocephalic  ENMT: Moist mucous membranes,   NERVOUS SYSTEM:  Fixed and dilated pupils   CHEST/LUNG: Coarse Breath sounds   HEART: Regular rate and rhythm; No murmurs, rubs, or gallops  ABDOMEN: Soft, Nontender, Nondistended; Bowel sounds present  EXTREMITIES:  2+ Peripheral Pulses, No clubbing, cyanosis, or edema      LABS:                        10.7   15.51 )-----------( 322      ( 29 Dec 2019 06:09 )             32.7     29 Dec 2019 06:09    141    |  108    |  19     ----------------------------<  108    3.8     |  25     |  1.13     Ca    8.1        29 Dec 2019 06:09  Phos  3.2       29 Dec 2019 06:09  Mg     2.3       29 Dec 2019 06:09    TPro  5.1    /  Alb  2.3    /  TBili  0.3    /  DBili  x      /  AST  200    /  ALT  161    /  AlkPhos  131    29 Dec 2019 06:09    PT/INR - ( 28 Dec 2019 19:09 )   PT: 13.1 sec;   INR: 1.20 ratio         PTT - ( 29 Dec 2019 06:11 )  PTT:43.1 sec  Urinalysis Basic - ( 27 Dec 2019 14:59 )    Color: Yellow / Appearance: Clear / S.010 / pH: x  Gluc: x / Ketone: Negative  / Bili: Negative / Urobili: Negative mg/dL   Blood: x / Protein: Negative mg/dL / Nitrite: Negative   Leuk Esterase: Trace / RBC: x / WBC 3-5   Sq Epi: x / Non Sq Epi: Occasional / Bacteria: Occasional      CAPILLARY BLOOD GLUCOSE      POCT Blood Glucose.: 103 mg/dL (29 Dec 2019 09:54)  POCT Blood Glucose.: 81 mg/dL (29 Dec 2019 08:09)  POCT Blood Glucose.: 102 mg/dL (29 Dec 2019 06:23)  POCT Blood Glucose.: 248 mg/dL (29 Dec 2019 04:41)  POCT Blood Glucose.: 95 mg/dL (29 Dec 2019 02:20)  POCT Blood Glucose.: 204 mg/dL (29 Dec 2019 00:14)  POCT Blood Glucose.: 158 mg/dL (28 Dec 2019 22:08)  POCT Blood Glucose.: 179 mg/dL (28 Dec 2019 20:18)  POCT Blood Glucose.: 203 mg/dL (28 Dec 2019 18:59)  POCT Blood Glucose.: 204 mg/dL (28 Dec 2019 18:04)  POCT Blood Glucose.: 198 mg/dL (28 Dec 2019 17:17)  POCT Blood Glucose.: 156 mg/dL (28 Dec 2019 16:23)  POCT Blood Glucose.: 143 mg/dL (28 Dec 2019 15:15)  POCT Blood Glucose.: 133 mg/dL (28 Dec 2019 14:22)  POCT Blood Glucose.: 120 mg/dL (28 Dec 2019 13:13)  POCT Blood Glucose.: 123 mg/dL (28 Dec 2019 12:12)      RADIOLOGY & ADDITIONAL TESTS:    Imaging Personally Reviewed:  [ ] YES     Consultant(s) Notes Reviewed:      Care Discussed with Consultants/Other Providers:    Advanced Directives: [ ] DNR  [ ] No feeding tube  [ ] MOLST in chart  [ ] MOLST completed today  [ ] Unknown

## 2019-12-29 NOTE — PROGRESS NOTE ADULT - ATTENDING COMMENTS
32 minutes of critical care time spent with the patient and coordinating care with nursing, hospitalist, and consultants. Patient is critically ill requiring ICU care due to cardiac arrest, respiratory failure, NSTEMI, anoxic brain injury. The patient is high risk for deterioration and death. 31 minutes of critical care time spent with the patient and coordinating care with nursing, hospitalist, and consultants. Patient is critically ill requiring ICU care due to cardiac arrest, respiratory failure, NSTEMI, PMVT, anoxic brain injury. The patient is high risk for deterioration and death.

## 2019-12-29 NOTE — PROGRESS NOTE ADULT - SUBJECTIVE AND OBJECTIVE BOX
Neurology Follow up note    KESHIA HANEY 59y Female    HPI: 58 y/o F w/ pmh of opioid abuse, depression, ovarian ca, presented to [facility] earlier today in cardiac arrest. Pt unable to provide hx, no family present. Per EMS pt was reported having been talking to friends when she suddenly had a seizure and then stopped breathing, per EMS friends immediately called 911 and EMS found pt in vfib. ACLS protocol was started pt received x4 epi and 2 shock and ROSC was achieve while in route to the ED pt arrest again and came into the trauma bay in cardiac arrest. ROSC was achieved again during 1st pulse checked. During my evaluation in the ED pt was bradycardic 40-60s, SBP 60 for which she was given 3L of wide open w/ some improvement in fluids. Pt underwent PAN-SCAN to r/o any acute brain/cervical/chest/abd/pelvis pathology and admitted to the ICU.    Interval History -    Patient is seen, chart was reviewed and case was discussed with the treatment team.  Pt is intubated on vent.  On Pressor.   and daugter are at beside.    Vital Signs Last 24 Hrs  T(C): 36.6 (29 Dec 2019 08:00), Max: 37.1 (29 Dec 2019 06:00)  T(F): 97.9 (29 Dec 2019 08:00), Max: 98.8 (29 Dec 2019 06:00)  HR: 81 (29 Dec 2019 15:47) (62 - 133)  BP: 134/75 (29 Dec 2019 15:00) (86/63 - 194/97)  BP(mean): 90 (29 Dec 2019 15:00) (61 - 123)  RR: 16 (29 Dec 2019 15:00) (13 - 41)  SpO2: 97% (29 Dec 2019 15:47) (91% - 98%)    MEDICATIONS    ALBUTerol    90 MICROgram(s) HFA Inhaler 2 Puff(s) Inhalation every 6 hours  chlorhexidine 0.12% Liquid 15 milliLiter(s) Oral Mucosa every 12 hours  dextrose 40% Gel 15 Gram(s) Oral once PRN  dextrose 5% + lactated ringers. 1000 milliLiter(s) IV Continuous <Continuous>  dextrose 5%. 1000 milliLiter(s) IV Continuous <Continuous>  dextrose 50% Injectable 12.5 Gram(s) IV Push once  dextrose 50% Injectable 25 Gram(s) IV Push once  dextrose 50% Injectable 25 Gram(s) IV Push once  fentaNYL    Injectable 50 MICROGram(s) IV Push every 1 hour PRN  glucagon  Injectable 1 milliGRAM(s) IntraMuscular once PRN  heparin  Infusion.  Unit(s)/Hr IV Continuous <Continuous>  heparin  Injectable 3800 Unit(s) IV Push every 6 hours PRN  insulin lispro (HumaLOG) corrective regimen sliding scale   SubCutaneous every 2 hours  midazolam Injectable 2 milliGRAM(s) IV Push every 30 minutes PRN  norepinephrine Infusion 0.05 MICROgram(s)/kG/Min IV Continuous <Continuous>  pantoprazole  Injectable 40 milliGRAM(s) IV Push daily  piperacillin/tazobactam IVPB.. 3.375 Gram(s) IV Intermittent every 8 hours  propofol Infusion 5 MICROgram(s)/kG/Min IV Continuous <Continuous>  valproate sodium IVPB 500 milliGRAM(s) IV Intermittent two times a day    Allergies    No Known Allergies    GENERAL: NAD, well-groomed, well-developed  HEAD:  Atraumatic, Normocephalic  EYES: EOMI, PERRLA, conjunctiva and sclera clear  NECK: Supple, No JVD, thyroid non-palpable    Neurological Exam  -    Intubated on vent.  On Pressors.  No response to pain is seen. Pain inflicted at b/l Ear lobes.  Pupils are now dilated and fixed at 5.5 mm - Not reacting to light.  B/L Corneal Reflexes  - absent  Doll's eyes  - Absent  Gag/cough reflexes   -absent.  Pt is NOT breathing over the vent now. Set rate is 16, breathing at 16  Cold caloric test was done  - No response seen.    LABS:    CBC Full  -  ( 29 Dec 2019 06:09 )  WBC Count : 15.51 K/uL  RBC Count : 3.45 M/uL  Hemoglobin : 10.7 g/dL  Hematocrit : 32.7 %  Platelet Count - Automated : 322 K/uL  Mean Cell Volume : 94.8 fl  Mean Cell Hemoglobin : 31.0 pg  Mean Cell Hemoglobin Concentration : 32.7 gm/dL    12-29    141  |  108  |  19  ----------------------------<  108<H>  3.8   |  25  |  1.13    Ca    8.1<L>      29 Dec 2019 06:09  Phos  3.2     12-29  Mg     2.3     12-29    TPro  5.1<L>  /  Alb  2.3<L>  /  TBili  0.3  /  DBili  x   /  AST  200<H>  /  ALT  161<H>  /  AlkPhos  131<H>  12-29    RADIOLOGY & ADDITIONAL STUDIES:    < from: CT Head No Cont (12.27.19 @ 15:28) >    IMPRESSION:    1)  diffuse anoxic changes to the brain with generalized cerebral edema and loss of gray-white differentiation. Follow-up MR imaging may be considered for further assessment, along with follow-up CT imaging.  2)  no obvious hemorrhage.    < end of copied text >    < from: CT Cervical Spine No Cont (12.27.19 @ 15:28) >    IMPRESSION:    No acute fracture identified. Multilevel degenerative changes.    < end of copied text >

## 2019-12-29 NOTE — PROGRESS NOTE ADULT - SUBJECTIVE AND OBJECTIVE BOX
Date/Time Patient Seen:  		  Referring MD:   Data Reviewed	       Patient is a 59y old  Female who presents with a chief complaint of cardiac arrest (28 Dec 2019 20:18)      Subjective/HPI     PAST MEDICAL & SURGICAL HISTORY:        Medication list         MEDICATIONS  (STANDING):  ALBUTerol    90 MICROgram(s) HFA Inhaler 2 Puff(s) Inhalation every 6 hours  chlorhexidine 0.12% Liquid 15 milliLiter(s) Oral Mucosa every 12 hours  dextrose 5%. 1000 milliLiter(s) (50 mL/Hr) IV Continuous <Continuous>  dextrose 50% Injectable 12.5 Gram(s) IV Push once  dextrose 50% Injectable 25 Gram(s) IV Push once  dextrose 50% Injectable 25 Gram(s) IV Push once  heparin  Infusion.  Unit(s)/Hr (7.5 mL/Hr) IV Continuous <Continuous>  insulin lispro (HumaLOG) corrective regimen sliding scale   SubCutaneous every 2 hours  lactated ringers. 1000 milliLiter(s) (75 mL/Hr) IV Continuous <Continuous>  norepinephrine Infusion 0.05 MICROgram(s)/kG/Min (6.094 mL/Hr) IV Continuous <Continuous>  pantoprazole  Injectable 40 milliGRAM(s) IV Push daily  piperacillin/tazobactam IVPB.. 3.375 Gram(s) IV Intermittent every 8 hours  propofol Infusion 5 MICROgram(s)/kG/Min (1.95 mL/Hr) IV Continuous <Continuous>  valproate sodium IVPB 500 milliGRAM(s) IV Intermittent two times a day    MEDICATIONS  (PRN):  dextrose 40% Gel 15 Gram(s) Oral once PRN Blood Glucose LESS THAN 70 milliGRAM(s)/deciliter  fentaNYL    Injectable 50 MICROGram(s) IV Push every 1 hour PRN agiation, distress  glucagon  Injectable 1 milliGRAM(s) IntraMuscular once PRN Glucose LESS THAN 70 milligrams/deciliter  heparin  Injectable 3800 Unit(s) IV Push every 6 hours PRN For aPTT less than 40  midazolam Injectable 2 milliGRAM(s) IV Push every 30 minutes PRN distress, dyspnea, anxiety, myoclonus         Vitals log        ICU Vital Signs Last 24 Hrs  T(C): 37 (29 Dec 2019 05:00), Max: 37 (29 Dec 2019 04:00)  T(F): 98.6 (29 Dec 2019 05:00), Max: 98.6 (29 Dec 2019 04:00)  HR: 83 (29 Dec 2019 05:43) (56 - 133)  BP: 122/69 (29 Dec 2019 05:15) (86/63 - 194/97)  BP(mean): 85 (29 Dec 2019 05:15) (61 - 132)  ABP: 120/72 (29 Dec 2019 04:32) (65/38 - 211/94)  ABP(mean): 89 (29 Dec 2019 04:32) (49 - 136)  RR: 16 (29 Dec 2019 05:15) (13 - 41)  SpO2: 97% (29 Dec 2019 05:43) (91% - 99%)       Mode: AC/ CMV (Assist Control/ Continuous Mandatory Ventilation)  RR (machine): 16  TV (machine): 400  FiO2: 25  PEEP: 5  ITime: 1  MAP: 9  PIP: 23      Input and Output:  I&O's Detail    27 Dec 2019 07:01  -  28 Dec 2019 07:00  --------------------------------------------------------  IN:    Enteral Tube Flush: 220 mL    heparin  Infusion.: 82.5 mL    lactated ringers.: 1875 mL    propofol Infusion: 20 mL    propofol Infusion: 4 mL    Solution: 300 mL    Solution: 200 mL  Total IN: 2701.5 mL    OUT:    Indwelling Catheter - Urethral: 1100 mL  Total OUT: 1100 mL    Total NET: 1601.5 mL      28 Dec 2019 07:01  -  29 Dec 2019 06:21  --------------------------------------------------------  IN:    heparin  Infusion.: 170 mL    lactated ringers.: 2000 mL    lactated ringers.: 450 mL    norepinephrine Infusion: 207.4 mL    propofol Infusion: 89.3 mL    Solution: 175 mL    Solution: 100 mL    Solution: 350 mL  Total IN: 3541.7 mL    OUT:    Indwelling Catheter - Urethral: 1545 mL  Total OUT: 1545 mL    Total NET: 1996.7 mL          Lab Data                        10.7   15.51 )-----------( 322      ( 29 Dec 2019 06:09 )             32.7     12-28    139  |  106  |  19  ----------------------------<  210<H>  3.7   |  20<L>  |  1.15    Ca    8.4      28 Dec 2019 19:09  Phos  3.9     12-28  Mg     2.3     12-28    TPro  6.0  /  Alb  2.6<L>  /  TBili  0.5  /  DBili  x   /  AST  313<H>  /  ALT  199<H>  /  AlkPhos  159<H>  12-28    ABG - ( 29 Dec 2019 02:53 )  pH, Arterial: x     pH, Blood: 7.35  /  pCO2: 41    /  pO2: 81    / HCO3: 22    / Base Excess: -2.8  /  SaO2: 95                CARDIAC MARKERS ( 28 Dec 2019 12:09 )  11.050 ng/mL / x     / x     / x     / x      CARDIAC MARKERS ( 28 Dec 2019 08:23 )  16.133 ng/mL / x     / x     / x     / x      CARDIAC MARKERS ( 28 Dec 2019 02:21 )  28.920 ng/mL / x     / x     / x     / x      CARDIAC MARKERS ( 27 Dec 2019 19:01 )  19.278 ng/mL / x     / x     / x     / x      CARDIAC MARKERS ( 27 Dec 2019 14:55 )  .394 ng/mL / x     / x     / x     / x            Review of Systems	      Objective     Physical Examination    head at  heart s1s2  lung dec BS  abd soft  et tube  a line      Pertinent Lab findings & Imaging      Ricci:  NO   Adequate UO     I&O's Detail    27 Dec 2019 07:01  -  28 Dec 2019 07:00  --------------------------------------------------------  IN:    Enteral Tube Flush: 220 mL    heparin  Infusion.: 82.5 mL    lactated ringers.: 1875 mL    propofol Infusion: 20 mL    propofol Infusion: 4 mL    Solution: 300 mL    Solution: 200 mL  Total IN: 2701.5 mL    OUT:    Indwelling Catheter - Urethral: 1100 mL  Total OUT: 1100 mL    Total NET: 1601.5 mL      28 Dec 2019 07:01  -  29 Dec 2019 06:21  --------------------------------------------------------  IN:    heparin  Infusion.: 170 mL    lactated ringers.: 2000 mL    lactated ringers.: 450 mL    norepinephrine Infusion: 207.4 mL    propofol Infusion: 89.3 mL    Solution: 175 mL    Solution: 100 mL    Solution: 350 mL  Total IN: 3541.7 mL    OUT:    Indwelling Catheter - Urethral: 1545 mL  Total OUT: 1545 mL    Total NET: 1996.7 mL               Discussed with:     Cultures:	        Radiology

## 2019-12-29 NOTE — PROGRESS NOTE ADULT - ASSESSMENT
Seen s/p cardiac arrest.  Seized in the beginning of the symptoms, likely sec to Anoxic brain injury.   Absent Brain stem functions.  Anoxic Encephalopathy.  CT Head - diffuse anoxic changes to the brain with generalized cerebral edema and loss of gray-white differentiation. No obvious hemorrhage.  Positive Troponin, on IV Heparin.  On Depacon 500 mg IV Q12h.  Utox screen is positive for Methamphetamines.  D/w pt's , step daughter at bedside at length. Clinical Exam, Lab results and radiological findings were explained to them thy understood well.   Prognosis is grave now - which is explained   - They understand well. Explained that Likelihood of fruitful recovery at this point is zero. Emotional support provided.    said that he would wait for few more days and would go forward for terminal weaning on 1/1/2020.  He would sign DNR papers.  Repeat CT Head will NOT be done with 's agreement.  D/w SPCU PA and covering nurse.  I will be available for staff/family to answer any questions at any time.  Would follow.

## 2019-12-29 NOTE — PROGRESS NOTE ADULT - SUBJECTIVE AND OBJECTIVE BOX
Critical Care PA     HPI 59F with PMHx of depression, opioid abuse (on methadone), ovarian ca, BIBA with Vfib arrest. Pt at home with ?seizure episode, and stopped breathing. EMS arrived and found Pt in Vfib arrest. Pt intubated in field, ACLS protocol intiated. Pt given epi x4, shock x2 with ROSC achieved. Pt went into cardiac arrest a second time en route to [facility] ED and again ACLS initiated with ROSC achieved after 1st pulse check. Pt admitted to SPCU for post cardiac arrest care, vent management and hypothermia protocol.     24hr Events: Episode of limited Torsades early evening. Pt remains intubated, on pressors for hemodynamic support. Neuro exam c/w anoxic injury. Pt was made DNR by .       --- PMHx.---  as above            Review Of Systems: [facility]    Previous Medical Record reviewed and case has been discussed with EICU.    --- Medications ---    ALBUTerol    90 MICROgram(s) HFA Inhaler 2 Puff(s)  chlorhexidine 0.12% Liquid 15 milliLiter(s)  dextrose 5% + lactated ringers. 1000 milliLiter(s)  dextrose 5%. 1000 milliLiter(s)  fentaNYL    Injectable 50 MICROGram(s) PRN  glucagon  Injectable 1 milliGRAM(s) PRN  heparin  Infusion.  Unit(s)/Hr  heparin  Injectable 3800 Unit(s) PRN  midazolam Injectable 2 milliGRAM(s) PRN  norepinephrine Infusion 0.05 MICROgram(s)/kG/Min  pantoprazole  Injectable 40 milliGRAM(s)  piperacillin/tazobactam IVPB.. 3.375 Gram(s)  propofol Infusion 5 MICROgram(s)/kG/Min  valproate sodium IVPB 500 milliGRAM(s)      DVT Prophylaxis :  heparin drip     Advanced Directives : DNR    T(C): 36.2 (12-29-19 @ 19:45), Max: 37.1 (12-29-19 @ 06:00)  HR: 66 (12-29-19 @ 20:10)  BP: 138/75 (12-29-19 @ 20:10)  RR: 16 (12-29-19 @ 20:10)  SpO2: 98% (12-29-19 @ 20:10)  Wt(kg): --    --- Physical Exam ---    Physical Exam:   Neuro: unresponsive, no gag reflux noted, not overbreathing the vent, no responds to verbal/tactile  HEENT: pupils fixed and dilated b/l  Resp: Good air entry b/l respiration synchronized with the vent  CVS: +RRR  Abd: BSx4, soft, nt/nd  Ext: no edema  Skin: warm/dry        --- Labratories ---                           10.7   15.51 )-----------( 322      ( 29 Dec 2019 06:09 )             32.7    12-29    141  |  108  |  19  ----------------------------<  108<H>  3.8   |  25  |  1.13    Ca    8.1<L>      29 Dec 2019 06:09  Phos  3.2     12-29  Mg     2.3     12-29    TPro  5.1<L>  /  Alb  2.3<L>  /  TBili  0.3  /  DBili  x   /  AST  200<H>  /  ALT  161<H>  /  AlkPhos  131<H>  12-29  PT/INR - ( 28 Dec 2019 19:09 )   PT: 13.1 sec;   INR: 1.20 ratio         PTT - ( 29 Dec 2019 18:51 )  PTT:47.6 sec  ABG - ( 29 Dec 2019 02:53 )  pH, Arterial: x     pH, Blood: 7.35  /  pCO2: 41    /  pO2: 81    / HCO3: 22    / Base Excess: -2.8  /  SaO2: 95                    Radiology  / Ultrasound :   Brain Ct 12/27>IMPRESSION:    1)  diffuse anoxic changes to the brain with generalized cerebral edema and loss of gray-white differentiation. Follow-up MR imaging may be considered for further assessment, along with follow-up CT imaging.  2)  no obvious hemorrhage.        (Reviewing data, imaging, discussing with multidisciplinary team, non inclusive of procedures, discussing goals of care with patient/family) Critical Care PA     HPI 59F with PMHx of depression, opioid abuse (on methadone), ovarian ca, BIBA with Vfib arrest. Pt at home with ?seizure episode, and stopped breathing. EMS arrived and found Pt in Vfib arrest. Pt intubated in field, ACLS protocol intiated. Pt given epi x4, shock x2 with ROSC achieved. Pt went into cardiac arrest a second time en route to [facility] ED and again ACLS initiated with ROSC achieved after 1st pulse check. Pt admitted to SPCU for post cardiac arrest care, vent management and hypothermia protocol.     24hr Events: Episode of limited Torsades early evening. Pt remains intubated, on pressors for hemodynamic support. Neuro exam c/w anoxic injury. Pt was made DNR by .       --- PMHx.---  as above            Review Of Systems: [facility]    Previous Medical Record reviewed and case has been discussed with EICU.    --- Medications ---    ALBUTerol    90 MICROgram(s) HFA Inhaler 2 Puff(s)  chlorhexidine 0.12% Liquid 15 milliLiter(s)  dextrose 5% + lactated ringers. 1000 milliLiter(s)  dextrose 5%. 1000 milliLiter(s)  fentaNYL    Injectable 50 MICROGram(s) PRN  glucagon  Injectable 1 milliGRAM(s) PRN  heparin  Infusion.  Unit(s)/Hr  heparin  Injectable 3800 Unit(s) PRN  midazolam Injectable 2 milliGRAM(s) PRN  norepinephrine Infusion 0.05 MICROgram(s)/kG/Min  pantoprazole  Injectable 40 milliGRAM(s)  piperacillin/tazobactam IVPB.. 3.375 Gram(s)  propofol Infusion 5 MICROgram(s)/kG/Min  valproate sodium IVPB 500 milliGRAM(s)      DVT Prophylaxis :  heparin drip     Advanced Directives : DNR    T(C): 36.2 (12-29-19 @ 19:45), Max: 37.1 (12-29-19 @ 06:00)  HR: 66 (12-29-19 @ 20:10)  BP: 138/75 (12-29-19 @ 20:10)  RR: 16 (12-29-19 @ 20:10)  SpO2: 98% (12-29-19 @ 20:10)  Wt(kg): --    --- Physical Exam ---    Physical Exam:   Neuro: unresponsive, no gag reflux noted, not overbreathing the vent, no responds to verbal/tactile  HEENT: pupils fixed and dilated b/l  Resp: Good air entry b/l respiration synchronized with the vent  CVS: +RRR  Abd: BSx4, soft, nt/nd  Ext: no edema  Skin: warm/dry        --- Labratories ---                           10.7   15.51 )-----------( 322      ( 29 Dec 2019 06:09 )             32.7    12-29    141  |  108  |  19  ----------------------------<  108<H>  3.8   |  25  |  1.13    Ca    8.1<L>      29 Dec 2019 06:09  Phos  3.2     12-29  Mg     2.3     12-29    TPro  5.1<L>  /  Alb  2.3<L>  /  TBili  0.3  /  DBili  x   /  AST  200<H>  /  ALT  161<H>  /  AlkPhos  131<H>  12-29  PT/INR - ( 28 Dec 2019 19:09 )   PT: 13.1 sec;   INR: 1.20 ratio         PTT - ( 29 Dec 2019 18:51 )  PTT:47.6 sec  ABG - ( 29 Dec 2019 02:53 )  pH, Arterial: x     pH, Blood: 7.35  /  pCO2: 41    /  pO2: 81    / HCO3: 22    / Base Excess: -2.8  /  SaO2: 95                    Radiology  / Ultrasound :   Brain Ct 12/27>IMPRESSION:    1)  diffuse anoxic changes to the brain with generalized cerebral edema and loss of gray-white differentiation. Follow-up MR imaging may be considered for further assessment, along with follow-up CT imaging.  2)  no obvious hemorrhage.

## 2019-12-30 NOTE — PROGRESS NOTE ADULT - SUBJECTIVE AND OBJECTIVE BOX
Patient is a 59y old  Female who presents with a chief complaint of cardiac arrest (30 Dec 2019 06:11)    INTERVAL HPI/OVERNIGHT EVENTS: pt unresponsive, events noted.   tele: alternating narrow complex tachycardia (?afib vs. SVT); episodes of wide complex tachycardia    MEDICATIONS  (STANDING):  ALBUTerol    90 MICROgram(s) HFA Inhaler 2 Puff(s) Inhalation every 6 hours  chlorhexidine 0.12% Liquid 15 milliLiter(s) Oral Mucosa every 12 hours  dextrose 5% + lactated ringers. 1000 milliLiter(s) (75 mL/Hr) IV Continuous <Continuous>  dextrose 5%. 1000 milliLiter(s) (50 mL/Hr) IV Continuous <Continuous>  heparin  Injectable 5000 Unit(s) SubCutaneous every 12 hours  norepinephrine Infusion 0.05 MICROgram(s)/kG/Min (6.094 mL/Hr) IV Continuous <Continuous>  pantoprazole  Injectable 40 milliGRAM(s) IV Push daily  piperacillin/tazobactam IVPB.. 3.375 Gram(s) IV Intermittent every 8 hours  propofol Infusion 5 MICROgram(s)/kG/Min (1.95 mL/Hr) IV Continuous <Continuous>  valproate sodium IVPB 500 milliGRAM(s) IV Intermittent two times a day    MEDICATIONS  (PRN):  fentaNYL    Injectable 50 MICROGram(s) IV Push every 1 hour PRN agiation, distress  glucagon  Injectable 1 milliGRAM(s) IntraMuscular once PRN Glucose LESS THAN 70 milligrams/deciliter  midazolam Injectable 2 milliGRAM(s) IV Push every 30 minutes PRN distress, dyspnea, anxiety, myoclonus    Allergies  No Known Allergies    REVIEW OF SYSTEMS:  unable    Vital Signs Last 24 Hrs  T(C): 36.4 (30 Dec 2019 04:00), Max: 36.4 (29 Dec 2019 15:59)  T(F): 97.5 (30 Dec 2019 04:00), Max: 97.5 (29 Dec 2019 15:59)  HR: 75 (30 Dec 2019 08:16) (62 - 152)  BP: 135/72 (30 Dec 2019 06:45) (69/41 - 193/100)  BP(mean): 90 (30 Dec 2019 06:45) (51 - 143)  RR: 16 (30 Dec 2019 06:45) (16 - 16)  SpO2: 99% (30 Dec 2019 08:16) (95% - 100%)    PHYSICAL EXAM:  GENERAL: intubated  EYES: conjunctiva and sclera clear  ENMT: Moist mucous membranes  NECK: Supple, No JVD  NERVOUS SYSTEM:  unresponsive, reflexes appear absent.   CHEST/LUNG: Clear to auscultation bilaterally; few rales  HEART: tachycardic s1s2  ABDOMEN: Soft, Nontender, Nondistended;   EXTREMITIES:  2+ Peripheral Pulses, No edema      LABS:    CAPILLARY BLOOD GLUCOSE  POCT Blood Glucose.: 108 mg/dL (30 Dec 2019 07:33)  POCT Blood Glucose.: 125 mg/dL (30 Dec 2019 02:30)  POCT Blood Glucose.: 117 mg/dL (29 Dec 2019 22:16)  POCT Blood Glucose.: 116 mg/dL (29 Dec 2019 18:35)  POCT Blood Glucose.: 120 mg/dL (29 Dec 2019 14:07)  POCT Blood Glucose.: 103 mg/dL (29 Dec 2019 09:54)      RADIOLOGY & ADDITIONAL TESTS:    Imaging Personally Reviewed:  [ ] YES     Consultant(s) Notes Reviewed:      Care Discussed with Consultants/Other Providers:    Advanced Directives: [ ] DNR  [ ] No feeding tube  [ ] MOLST in chart  [ ] MOLST completed today  [ ] Unknown

## 2019-12-30 NOTE — PROGRESS NOTE ADULT - ASSESSMENT
The patient is a 59 year old female with unknown past medical history who presents with cardiac arrest.    Plan:  - Echocardiogram with grossly normal LV systolic function  - Urine tox noted - positive for methamphetamine, opiates, and methadone  - Troponin peaked at 28 and trending down. Cannot exclude type 1 NSTEMI, but more likely enzymes are elevated due to arrest and poor cardiac perfusion at that time.  - Discontinue heparin drip  - Recurrent PMVT. Avoid bradycardia. Can start lidocaine drip if recurs/persists. Not a candidate for defibrillation due to now DNR.  - Mechanical ventilation  - Concern for brain herniation. Neurology follow-up.  - Overall prognosis poor. Family may opt for terminal extubation.

## 2019-12-30 NOTE — PROGRESS NOTE ADULT - ASSESSMENT
58 y/o F w/ pmh of opioid abuse, depression, ovarian ca, presented to [facility] earlier today in cardiac arrest. Pt unable to provide hx, no family present. Per EMS pt was reported having been talking to friends when she suddenly had a seizure and then stopped breathing, per EMS friends immediately called 911 and EMS found pt in vfib. ACLS protocol was started pt received x4 epi and 2 shock and ROSC was achieve while in route to the ED pt arrest again and came into the trauma bay in cardiac arrest. ROSC was achieved again during 1st pulse checked. Pt now admitted to the SPCU for cardiac arrest.    -Neuro: Diffuse anxious brain injury noted on CTH, pt now with likely brain herniation given exam finding, after extensive discussion w/ family by neurology pt likely to undergo terminal extubation on 20  -Cardiac: s/p cardiac arrest now s/p hypothermia protocol, maintain core temp at 36, NSTEMI cont heparin drip d/c by cards, Shock likely 2/2 to brain herniation cont levophed to maintain MAP >90  -Resp: Cont lung protective ventilation, maintain o2 >92%  -GI: NPO for now, GI ppx w/ protonix   -Renal: LORNE improved, cont to monitor renal function and lyntes closely, replete w/ renal precautions, monitor I&O closely  -ID: Possible asp event cont IV zosyn for now, monitor Wbc/temp for now, LA at 13 now resolved, urine/blood cx neg so far   -Endo: No acute process, FS trend noted, Na noted cont LR w/ D5 and change FS from q2h--->q4h, K/phos noted will replete  -Heme: DVT ppx w/ heparin for now  -Dispo: Admit to ICU, pt w/ critical illness, giving CTH finding prognosis is poor and now exam concerning for brain herniation, there is a possibility that cardiac arrest could have been drug OD on amphetamine?, Long discussion held by neurologist with  regarding poor prognosis and understand the circumstance and requesting to withdraw care on pt on 2020 if pt dose not  on her own, I have discussed with family again today regarding poor prognosis and they have agreed to stop check labs as overall prognosis is poor, case d/w, cardiology, neurology, hospitalize dr. arredondo input noted.

## 2019-12-30 NOTE — PROGRESS NOTE ADULT - ASSESSMENT
60 y/o F w/ pmh of opioid abuse, depression, ovarian ca, presented to [facility] earlier today in cardiac arrest. Pt unable to provide hx, no family present. Per EMS pt was reported having been talking to friends when she suddenly had a seizure and then stopped breathing, per EMS friends immediately called 911 and EMS found pt in vfib. ACLS protocol was started pt received x4 epi and 2 shock and ROSC was achieve while in route to the ED pt arrest again and came into the trauma bay in cardiac arrest. ROSC was achieved again during 1st pulse checked. Pt now admitted to the SPCU.    Cardiac Arrest   Etiology unknown but suspect Methamphetiamine OD   Cardiology on board  Continue TTM and MAP >90  Pressors PRN; Intubated   Suspect brain death with fixed and dilated pupils   Possible terminal extubation   Having arrhythmias on monitor - likely related to cardiac ischemia from original event.   CC: Dr. Ramírez; Cardiology: Dr. Corley    Acute Respiratory Failure   Possible Aspiration; IV Zosyn;  Nebulizers PRN   Intubated   Follow up cultures     Transaminitis   Most likely from shock liver with possible underlying liver disease   Will maintain MAP and trend for now    Diet  NPO    DVT Prophylaxis  Heparin SC     Disposition  Terminal extubation possible when family ready - need emotional support.   There is major suspicion for brain herniation and brain death; prognosis is very grave

## 2019-12-30 NOTE — PROGRESS NOTE ADULT - SUBJECTIVE AND OBJECTIVE BOX
Neurology Follow up note    KESHIA HANEY 59y Female    HPI: 58 y/o F w/ pmh of opioid abuse, depression, ovarian ca, presented to [facility] earlier today in cardiac arrest. Pt unable to provide hx, no family present. Per EMS pt was reported having been talking to friends when she suddenly had a seizure and then stopped breathing, per EMS friends immediately called 911 and EMS found pt in vfib. ACLS protocol was started pt received x4 epi and 2 shock and ROSC was achieve while in route to the ED pt arrest again and came into the trauma bay in cardiac arrest. ROSC was achieved again during 1st pulse checked. During my evaluation in the ED pt was bradycardic 40-60s, SBP 60 for which she was given 3L of wide open w/ some improvement in fluids. Pt underwent PAN-SCAN to r/o any acute brain/cervical/chest/abd/pelvis pathology and admitted to the ICU for further tx and evaluation.    Interval History -    Patient is seen, chart was reviewed and case was discussed with the treatment team.  /step daughter are at bedside.  Pt is on Pressor.  On Propofol.    Vital Signs Last 24 Hrs  T(C): 36.2 (30 Dec 2019 08:30), Max: 36.4 (29 Dec 2019 15:59)  T(F): 97.2 (30 Dec 2019 08:30), Max: 97.5 (29 Dec 2019 15:59)  HR: 80 (30 Dec 2019 11:38) (62 - 152)  BP: 148/113 (30 Dec 2019 11:00) (69/41 - 193/100)  BP(mean): 124 (30 Dec 2019 11:00) (51 - 143)  RR: 16 (30 Dec 2019 11:00) (16 - 16)  SpO2: 98% (30 Dec 2019 11:38) (95% - 100%)     MEDICATIONS    ALBUTerol    90 MICROgram(s) HFA Inhaler 2 Puff(s) Inhalation every 6 hours  chlorhexidine 0.12% Liquid 15 milliLiter(s) Oral Mucosa every 12 hours  dextrose 5% + lactated ringers. 1000 milliLiter(s) IV Continuous <Continuous>  dextrose 5%. 1000 milliLiter(s) IV Continuous <Continuous>  fentaNYL    Injectable 50 MICROGram(s) IV Push every 1 hour PRN  glucagon  Injectable 1 milliGRAM(s) IntraMuscular once PRN  heparin  Injectable 5000 Unit(s) SubCutaneous every 12 hours  midazolam Injectable 2 milliGRAM(s) IV Push every 30 minutes PRN  norepinephrine Infusion 0.05 MICROgram(s)/kG/Min IV Continuous <Continuous>  pantoprazole  Injectable 40 milliGRAM(s) IV Push daily  piperacillin/tazobactam IVPB.. 3.375 Gram(s) IV Intermittent every 8 hours  propofol Infusion 5 MICROgram(s)/kG/Min IV Continuous <Continuous>  valproate sodium IVPB 500 milliGRAM(s) IV Intermittent two times a day    Allergies    No Known Allergies    Neurological Exam  -    Intubated on vent.  On Pressors and Propofol.  Unresponsive to all stimuli.  No response to pain is seen. Pain inflicted at b/l Ear lobes.  Pupils are dilated and fixed at 5.5 mm - Not reacting to light.  B/L Corneal Reflexes  - absent  Doll's eyes  - Absent  Gag/cough reflexes   - absent.  Pt is NOT breathing over the vent now. Set rate is 16, breathing at 16  Cold caloric test was done  - No response seen.    LABS:    CBC Full  -  ( 30 Dec 2019 09:23 )  WBC Count : 14.74 K/uL  RBC Count : 3.89 M/uL  Hemoglobin : 12.2 g/dL  Hematocrit : 38.6 %  Platelet Count - Automated : 271 K/uL  Mean Cell Volume : 99.2 fl  Mean Cell Hemoglobin : 31.4 pg  Mean Cell Hemoglobin Concentration : 31.6 gm/dL    12-30    165<HH>  |  130<H>  |  12  ----------------------------<  149<H>  3.0<L>   |  28  |  1.15    Ca    9.2      30 Dec 2019 09:23  Phos  2.2     12-30  Mg     2.5     12-30    TPro  6.0  /  Alb  2.1<L>  /  TBili  0.2  /  DBili  x   /  AST  107<H>  /  ALT  109<H>  /  AlkPhos  133<H>  12-30    RADIOLOGY & ADDITIONAL STUDIES:    < from: CT Head No Cont (12.27.19 @ 15:28) >    IMPRESSION:    1)  diffuse anoxic changes to the brain with generalized cerebral edema and loss of gray-white differentiation. Follow-up MR imaging may be considered for further assessment, along with follow-up CT imaging.  2)  no obvious hemorrhage.    < end of copied text >    < from: CT Cervical Spine No Cont (12.27.19 @ 15:28) >    IMPRESSION:    No acute fracture identified. Multilevel degenerative changes.    < end of copied text >

## 2019-12-30 NOTE — PROGRESS NOTE ADULT - SUBJECTIVE AND OBJECTIVE BOX
Date/Time Patient Seen:  		  Referring MD:   Data Reviewed	       Patient is a 59y old  Female who presents with a chief complaint of cardiac arrest (29 Dec 2019 20:59)      Subjective/HPI     PAST MEDICAL & SURGICAL HISTORY:        Medication list         MEDICATIONS  (STANDING):  ALBUTerol    90 MICROgram(s) HFA Inhaler 2 Puff(s) Inhalation every 6 hours  chlorhexidine 0.12% Liquid 15 milliLiter(s) Oral Mucosa every 12 hours  dextrose 5% + lactated ringers. 1000 milliLiter(s) (75 mL/Hr) IV Continuous <Continuous>  dextrose 5%. 1000 milliLiter(s) (50 mL/Hr) IV Continuous <Continuous>  heparin  Infusion.  Unit(s)/Hr (7.5 mL/Hr) IV Continuous <Continuous>  norepinephrine Infusion 0.05 MICROgram(s)/kG/Min (6.094 mL/Hr) IV Continuous <Continuous>  pantoprazole  Injectable 40 milliGRAM(s) IV Push daily  piperacillin/tazobactam IVPB.. 3.375 Gram(s) IV Intermittent every 8 hours  propofol Infusion 5 MICROgram(s)/kG/Min (1.95 mL/Hr) IV Continuous <Continuous>  valproate sodium IVPB 500 milliGRAM(s) IV Intermittent two times a day    MEDICATIONS  (PRN):  fentaNYL    Injectable 50 MICROGram(s) IV Push every 1 hour PRN agiation, distress  glucagon  Injectable 1 milliGRAM(s) IntraMuscular once PRN Glucose LESS THAN 70 milligrams/deciliter  heparin  Injectable 3800 Unit(s) IV Push every 6 hours PRN For aPTT less than 40  midazolam Injectable 2 milliGRAM(s) IV Push every 30 minutes PRN distress, dyspnea, anxiety, myoclonus         Vitals log        ICU Vital Signs Last 24 Hrs  T(C): 36.4 (30 Dec 2019 04:00), Max: 36.6 (29 Dec 2019 08:00)  T(F): 97.5 (30 Dec 2019 04:00), Max: 97.9 (29 Dec 2019 08:00)  HR: 136 (30 Dec 2019 05:45) (62 - 152)  BP: 136/101 (30 Dec 2019 05:45) (69/41 - 193/100)  BP(mean): 109 (30 Dec 2019 05:45) (51 - 143)  ABP: 146/74 (30 Dec 2019 05:15) (67/38 - 152/76)  ABP(mean): 98 (30 Dec 2019 05:15) (50 - 113)  RR: 16 (30 Dec 2019 05:45) (16 - 16)  SpO2: 99% (30 Dec 2019 05:45) (95% - 100%)       Mode: AC/ CMV (Assist Control/ Continuous Mandatory Ventilation)  RR (machine): 16  TV (machine): 400  FiO2: 30  PEEP: 5  ITime: 1  MAP: 10  PIP: 28      Input and Output:  I&O's Detail    28 Dec 2019 07:01  -  29 Dec 2019 07:00  --------------------------------------------------------  IN:    heparin  Infusion.: 194.5 mL    lactated ringers.: 525 mL    lactated ringers.: 2000 mL    norepinephrine Infusion: 254.9 mL    propofol Infusion: 91.3 mL    Solution: 350 mL    Solution: 175 mL    Solution: 100 mL  Total IN: 3690.7 mL    OUT:    Indwelling Catheter - Urethral: 1545 mL  Total OUT: 1545 mL    Total NET: 2145.7 mL      29 Dec 2019 07:01  -  30 Dec 2019 06:12  --------------------------------------------------------  IN:    dextrose 5% + lactated ringers.: 1350 mL    heparin  Infusion.: 235 mL    lactated ringers.: 300 mL    norepinephrine Infusion: 311.2 mL    Solution: 50 mL    Solution: 200 mL    Solution: 150 mL  Total IN: 2596.2 mL    OUT:    Indwelling Catheter - Urethral: 4200 mL    Voided: 400 mL  Total OUT: 4600 mL    Total NET: -2003.8 mL          Lab Data                        10.7   15.51 )-----------( 322      ( 29 Dec 2019 06:09 )             32.7     12-29    141  |  108  |  19  ----------------------------<  108<H>  3.8   |  25  |  1.13    Ca    8.1<L>      29 Dec 2019 06:09  Phos  3.2     12-29  Mg     2.3     12-29    TPro  5.1<L>  /  Alb  2.3<L>  /  TBili  0.3  /  DBili  x   /  AST  200<H>  /  ALT  161<H>  /  AlkPhos  131<H>  12-29    ABG - ( 29 Dec 2019 02:53 )  pH, Arterial: x     pH, Blood: 7.35  /  pCO2: 41    /  pO2: 81    / HCO3: 22    / Base Excess: -2.8  /  SaO2: 95                CARDIAC MARKERS ( 29 Dec 2019 06:09 )  4.294 ng/mL / x     / x     / x     / x      CARDIAC MARKERS ( 28 Dec 2019 12:09 )  11.050 ng/mL / x     / x     / x     / x      CARDIAC MARKERS ( 28 Dec 2019 08:23 )  16.133 ng/mL / x     / x     / x     / x            Review of Systems	      Objective     Physical Examination      head at  heart s1s2  lung dec BS  abd soft    Pertinent Lab findings & Imaging      Ricci:  NO   Adequate UO     I&O's Detail    28 Dec 2019 07:01  -  29 Dec 2019 07:00  --------------------------------------------------------  IN:    heparin  Infusion.: 194.5 mL    lactated ringers.: 525 mL    lactated ringers.: 2000 mL    norepinephrine Infusion: 254.9 mL    propofol Infusion: 91.3 mL    Solution: 350 mL    Solution: 175 mL    Solution: 100 mL  Total IN: 3690.7 mL    OUT:    Indwelling Catheter - Urethral: 1545 mL  Total OUT: 1545 mL    Total NET: 2145.7 mL      29 Dec 2019 07:01  -  30 Dec 2019 06:12  --------------------------------------------------------  IN:    dextrose 5% + lactated ringers.: 1350 mL    heparin  Infusion.: 235 mL    lactated ringers.: 300 mL    norepinephrine Infusion: 311.2 mL    Solution: 50 mL    Solution: 200 mL    Solution: 150 mL  Total IN: 2596.2 mL    OUT:    Indwelling Catheter - Urethral: 4200 mL    Voided: 400 mL  Total OUT: 4600 mL    Total NET: -2003.8 mL               Discussed with:     Cultures:	        Radiology

## 2019-12-30 NOTE — PROGRESS NOTE ADULT - ATTENDING COMMENTS
33 minutes of critical care time spent with the patient and coordinating care with nursing, hospitalist, and consultants. Patient is critically ill requiring ICU care due to cardiac arrest, respiratory failure, NSTEMI, PMVT, anoxic brain injury. The patient is high risk for deterioration and death.

## 2019-12-30 NOTE — PROGRESS NOTE ADULT - SUBJECTIVE AND OBJECTIVE BOX
REASON FOR CONSULT: ***    CONSULT REQUESTED BY: ***    Patient is a 59y old  Female who presents with a chief complaint of cardiac arrest (30 Dec 2019 15:09)      BRIEF HOSPITAL COURSE: ***    Events last 24 hours: ***    PAST MEDICAL & SURGICAL HISTORY:    Allergies    No Known Allergies    Intolerances      FAMILY HISTORY:      Review of Systems:  CONSTITUTIONAL: No fever, chills, or fatigue  EYES: No eye pain, visual disturbances, or discharge  ENMT:  No difficulty hearing, tinnitus, vertigo; No sinus or throat pain  NECK: No pain or stiffness  RESPIRATORY: No cough, wheezing, chills or hemoptysis; No shortness of breath  CARDIOVASCULAR: No chest pain, palpitations, dizziness, or leg swelling  GASTROINTESTINAL: No abdominal or epigastric pain. No nausea, vomiting, or hematemesis; No diarrhea or constipation. No melena or hematochezia.  GENITOURINARY: No dysuria, frequency, hematuria, or incontinence  NEUROLOGICAL: No headaches, memory loss, loss of strength, numbness, or tremors  SKIN: No itching, burning, rashes, or lesions   MUSCULOSKELETAL: No joint pain or swelling; No muscle, back, or extremity pain  PSYCHIATRIC: No depression, anxiety, mood swings, or difficulty sleeping      Medications:  piperacillin/tazobactam IVPB.. 3.375 Gram(s) IV Intermittent every 8 hours    norepinephrine Infusion 0.05 MICROgram(s)/kG/Min IV Continuous <Continuous>    ALBUTerol    90 MICROgram(s) HFA Inhaler 2 Puff(s) Inhalation every 6 hours    fentaNYL    Injectable 50 MICROGram(s) IV Push every 1 hour PRN  midazolam Injectable 2 milliGRAM(s) IV Push every 30 minutes PRN  valproate sodium IVPB 500 milliGRAM(s) IV Intermittent two times a day      heparin  Injectable 5000 Unit(s) SubCutaneous every 12 hours    pantoprazole  Injectable 40 milliGRAM(s) IV Push daily      glucagon  Injectable 1 milliGRAM(s) IntraMuscular once PRN    dextrose 5% with potassium chloride 20 mEq/L 1000 milliLiter(s) IV Continuous <Continuous>  dextrose 5%. 1000 milliLiter(s) IV Continuous <Continuous>      chlorhexidine 0.12% Liquid 15 milliLiter(s) Oral Mucosa every 12 hours        Mode: AC/ CMV (Assist Control/ Continuous Mandatory Ventilation)  RR (machine): 16  TV (machine): 400  FiO2: 35  PEEP: 5  ITime: 1  MAP: 8  PIP: 22      ICU Vital Signs Last 24 Hrs  T(C): 35.6 (30 Dec 2019 12:30), Max: 36.4 (30 Dec 2019 04:00)  T(F): 96.1 (30 Dec 2019 12:30), Max: 97.5 (30 Dec 2019 04:00)  HR: 140 (30 Dec 2019 18:00) (62 - 152)  BP: 92/54 (30 Dec 2019 18:00) (69/41 - 193/100)  BP(mean): 66 (30 Dec 2019 18:00) (51 - 146)  ABP: 146/74 (30 Dec 2019 05:15) (92/51 - 152/76)  ABP(mean): 98 (30 Dec 2019 05:15) (69 - 113)  RR: 16 (30 Dec 2019 18:00) (16 - 16)  SpO2: 99% (30 Dec 2019 18:00) (95% - 100%)    Vital Signs Last 24 Hrs  T(C): 35.6 (30 Dec 2019 12:30), Max: 36.4 (30 Dec 2019 04:00)  T(F): 96.1 (30 Dec 2019 12:30), Max: 97.5 (30 Dec 2019 04:00)  HR: 140 (30 Dec 2019 18:00) (62 - 152)  BP: 92/54 (30 Dec 2019 18:00) (69/41 - 193/100)  BP(mean): 66 (30 Dec 2019 18:00) (51 - 146)  RR: 16 (30 Dec 2019 18:00) (16 - 16)  SpO2: 99% (30 Dec 2019 18:00) (95% - 100%)    ABG - ( 29 Dec 2019 02:53 )  pH, Arterial: x     pH, Blood: 7.35  /  pCO2: 41    /  pO2: 81    / HCO3: 22    / Base Excess: -2.8  /  SaO2: 95                  I&O's Detail    29 Dec 2019 07:01  -  30 Dec 2019 07:00  --------------------------------------------------------  IN:    dextrose 5% + lactated ringers.: 1500 mL    heparin  Infusion.: 256 mL    lactated ringers.: 300 mL    norepinephrine Infusion: 335.6 mL    Solution: 150 mL    Solution: 50 mL    Solution: 200 mL  Total IN: 2791.6 mL    OUT:    Indwelling Catheter - Urethral: 5300 mL    Voided: 400 mL  Total OUT: 5700 mL    Total NET: -2908.4 mL      30 Dec 2019 07:01  -  30 Dec 2019 19:59  --------------------------------------------------------  IN:    dextrose 5% + lactated ringers.: 675 mL    dextrose 5% with potassium chloride 20 mEq/L: 375 mL    norepinephrine Infusion: 146.4 mL    Solution: 100 mL    Solution: 500 mL  Total IN: 1796.4 mL    OUT:    Indwelling Catheter - Urethral: 2400 mL  Total OUT: 2400 mL    Total NET: -603.6 mL            LABS:                        12.2   14.74 )-----------( 271      ( 30 Dec 2019 09:23 )             38.6     12-30    165<HH>  |  130<H>  |  12  ----------------------------<  149<H>  3.0<L>   |  28  |  1.15    Ca    9.2      30 Dec 2019 09:23  Phos  2.2     12-30  Mg     2.5     12-30    TPro  6.0  /  Alb  2.1<L>  /  TBili  0.2  /  DBili  x   /  AST  107<H>  /  ALT  109<H>  /  AlkPhos  133<H>  12-30      CARDIAC MARKERS ( 29 Dec 2019 06:09 )  4.294 ng/mL / x     / x     / x     / x          CAPILLARY BLOOD GLUCOSE      POCT Blood Glucose.: 120 mg/dL (30 Dec 2019 18:07)    PTT - ( 30 Dec 2019 02:20 )  PTT:69.8 sec    CULTURES:  Culture Results:   No growth to date. (12-27 @ 22:05)  Culture Results:   No growth to date. (12-27 @ 22:05)  Culture Results:   No growth (12-27 @ 21:52)      Physical Examination:    General: No acute distress.  Alert, oriented, interactive, nonfocal    HEENT: Pupils equal, reactive to light.  Symmetric.    PULM: Clear to auscultation bilaterally, no significant sputum production    CVS: Regular rate and rhythm, no murmurs, rubs, or gallops    ABD: Soft, nondistended, nontender, normoactive bowel sounds, no masses    EXT: No edema, nontender    SKIN: Warm and well perfused, no rashes noted.    RADIOLOGY: ***    CRITICAL CARE TIME SPENT: *** Patient is a 59y old  Female who presents with a chief complaint of cardiac arrest (30 Dec 2019 15:09)      BRIEF HOSPITAL COURSE:   Patient is a 59 year old female with a pmhx of depression, opioid abuse (on methadone), ovarian ca who was brought in 12/27/19 for a cardiac arrest. Per family at bedside patient had seizure like activity while at home and subsequently collapsed in front of family member. EMS called and patient found in vfib arrest. Approximate down time was 10 minutes. She was then intubated in field, ACLS protocol initiated, 4 epis and x shocks given en route to [facility]. ROSC achieved. Patient then went into cardiac arrest again, ACLS initiated and ROSC achieved Upon presentation to the ED she was found to have CTH with diffuse anoxic injury with generalized edema. CT chest/abd with aspiration pneumonitis and colonic ileus. She was started on hypothermia protocol given not meaningful neuro exam post cardiac arrest. Troponin was also 19 with diffuse ST segment depression on EK. Started on heparin drip and admitted to [facility].     Events last 24 hours:   Anoxic brain injury   no brainstem reflexes   DNR  episode of torsades and afib that has resolved without intervention   terminal extubation on 1/1/2020     PAST MEDICAL & SURGICAL HISTORY:    Allergies    No Known Allergies    Intolerances      FAMILY HISTORY:  unable to assess    Review of Systems:  unable to assess      Medications:  piperacillin/tazobactam IVPB.. 3.375 Gram(s) IV Intermittent every 8 hours  norepinephrine Infusion 0.05 MICROgram(s)/kG/Min IV Continuous <Continuous>  ALBUTerol    90 MICROgram(s) HFA Inhaler 2 Puff(s) Inhalation every 6 hours  fentaNYL    Injectable 50 MICROGram(s) IV Push every 1 hour PRN  midazolam Injectable 2 milliGRAM(s) IV Push every 30 minutes PRN  valproate sodium IVPB 500 milliGRAM(s) IV Intermittent two times a day  heparin  Injectable 5000 Unit(s) SubCutaneous every 12 hours  pantoprazole  Injectable 40 milliGRAM(s) IV Push christiano  glucagon  Injectable 1 milliGRAM(s) IntraMuscular once PRN  dextrose 5% with potassium chloride 20 mEq/L 1000 milliLiter(s) IV Continuous <Continuous>  dextrose 5%. 1000 milliLiter(s) IV Continuous <Continuous>  chlorhexidine 0.12% Liquid 15 milliLiter(s) Oral Mucosa every 12 hours    Mode: AC/ CMV (Assist Control/ Continuous Mandatory Ventilation)  RR (machine): 16  TV (machine): 400  FiO2: 35  PEEP: 5  ITime: 1  MAP: 8  PIP: 22      ICU Vital Signs Last 24 Hrs  T(C): 35.6 (30 Dec 2019 12:30), Max: 36.4 (30 Dec 2019 04:00)  T(F): 96.1 (30 Dec 2019 12:30), Max: 97.5 (30 Dec 2019 04:00)  HR: 140 (30 Dec 2019 18:00) (62 - 152)  BP: 92/54 (30 Dec 2019 18:00) (69/41 - 193/100)  BP(mean): 66 (30 Dec 2019 18:00) (51 - 146)  ABP: 146/74 (30 Dec 2019 05:15) (92/51 - 152/76)  ABP(mean): 98 (30 Dec 2019 05:15) (69 - 113)  RR: 16 (30 Dec 2019 18:00) (16 - 16)  SpO2: 99% (30 Dec 2019 18:00) (95% - 100%)    ABG - ( 29 Dec 2019 02:53 )  pH, Arterial: x     pH, Blood: 7.35  /  pCO2: 41    /  pO2: 81    / HCO3: 22    / Base Excess: -2.8  /  SaO2: 95            I&O's Detail    29 Dec 2019 07:01  -  30 Dec 2019 07:00  --------------------------------------------------------  IN:    dextrose 5% + lactated ringers.: 1500 mL    heparin  Infusion.: 256 mL    lactated ringers.: 300 mL    norepinephrine Infusion: 335.6 mL    Solution: 150 mL    Solution: 50 mL    Solution: 200 mL  Total IN: 2791.6 mL    OUT:    Indwelling Catheter - Urethral: 5300 mL    Voided: 400 mL  Total OUT: 5700 mL    Total NET: -2908.4 mL      30 Dec 2019 07:01  -  30 Dec 2019 19:59  --------------------------------------------------------  IN:    dextrose 5% + lactated ringers.: 675 mL    dextrose 5% with potassium chloride 20 mEq/L: 375 mL    norepinephrine Infusion: 146.4 mL    Solution: 100 mL    Solution: 500 mL  Total IN: 1796.4 mL    OUT:    Indwelling Catheter - Urethral: 2400 mL  Total OUT: 2400 mL    Total NET: -603.6 mL        LABS:                        12.2   14.74 )-----------( 271      ( 30 Dec 2019 09:23 )             38.6     12-30    165<HH>  |  130<H>  |  12  ----------------------------<  149<H>  3.0<L>   |  28  |  1.15    Ca    9.2      30 Dec 2019 09:23  Phos  2.2     12-30  Mg     2.5     12-30    TPro  6.0  /  Alb  2.1<L>  /  TBili  0.2  /  DBili  x   /  AST  107<H>  /  ALT  109<H>  /  AlkPhos  133<H>  12-30      CARDIAC MARKERS ( 29 Dec 2019 06:09 )  4.294 ng/mL / x     / x     / x     / x          CAPILLARY BLOOD GLUCOSE      POCT Blood Glucose.: 120 mg/dL (30 Dec 2019 18:07)    PTT - ( 30 Dec 2019 02:20 )  PTT:69.8 sec    CULTURES:  Culture Results:   No growth to date. (12-27 @ 22:05)  Culture Results:   No growth to date. (12-27 @ 22:05)  Culture Results:   No growth (12-27 @ 21:52)      Physical Examination:    General: intubated    HEENT: Pupils fixed, dilated at 6mm    PULM: rhonchi in both lung fields     CVS: Regular rate and rhythm, no murmurs, rubs, or gallops    ABD: Soft, nondistended, nontender, normoactive bowel sounds, no masses    EXT: No edema, nontender    SKIN: Warm and well perfused, no rashes noted.    Neuro: no brainstem reflexes, no gag, corneal, of withdraw to pain     RADIOLOGY:   < from: CT Head No Cont (12.27.19 @ 15:28) >    INTERPRETATION:  INDICATION:  Altered mental status. Status post cardiac arrest.  TECHNIQUE:  A non contrast 2.5mm axial CT study of the brain was performed from skull base to vertex. Coronal and sagittal reformations were generated from the axial data.  COMPARISON EXAMINATION:  No prior    FINDINGS:      There is a diffuse anoxic changes to the brain. There is generalized loss of gray-white differentiation throughout both hemispheres with brain swelling. There is gyral and sulcal effacement with diminution in size of the cisternal and extracerebral spaces. Ventricles are midline.    Similar anoxic changes are also suggested in the brainstem and cerebellumwith decreasing extracerebral spaces and with loss of gray-white differentiation.    There is no obvious hematoma.    The patient is intubated.    IMPRESSION:    1)  diffuse anoxic changes to the brain with generalized cerebral edema and loss of gray-white differentiation. Follow-up MR imaging may be considered for further assessment, along with follow-up CT imaging.  2)  no obvious hemorrhage.    < from: CT Chest No Cont (12.27.19 @ 15:32) >  INTERPRETATION:  CLINICAL INFORMATION: Cardiac arrest    COMPARISON: None.    PROCEDURE:   CT of the Chest, Abdomen and Pelvis was performed without intravenous contrast.   Intravenous contrast: None.  Oral contrast: None.  Sagittal and coronal reformats were performed.    FINDINGS:    CHEST:     LUNGS AND LARGE AIRWAYS: Patent central airways. Endotracheal tube in position. Multifocal bilateral airspace and groundglass infiltrates in the lingula segment of left upper lobe left lower lobe and the right upper lobe presumptive multifocal pneumonia in appropriate clinical setting. Element of aspiration could be present. Dependent atelectasis left lower lobe.  PLEURA: No pleural effusion.  VESSELS: Nonaneurysmal  HEART: Heart size is normal. No pericardial effusion.  MEDIASTINUM AND BRYON: Scattered nonspecific subcentimeter mediastinal lymph nodes. Lack of IV contrast limits hilar evaluation  CHEST WALL AND LOWER NECK: Within normal limits.    ABDOMEN AND PELVIS:    LIVER: Within normal limits.  BILE DUCTS: Normal caliber.  GALLBLADDER: Within normal limits.  SPLEEN: Within normal limits.  PANCREAS: Within normal limits.  ADRENALS: Within normal limits.  KIDNEYS/URETERS: Within normal limits.    BLADDER: Partially decompressed with Wynne  REPRODUCTIVE ORGANS: Calcified uterine fibroid    BOWEL: No bowel obstruction. Moderate colonic ileus.. Appendix no evidence of appendicitis  PERITONEUM: No ascites.  VESSELS: Nonaneurysmal.  RETROPERITONEUM/LYMPH NODES: No lymphadenopathy.    ABDOMINAL WALL: Within normal limits.  BONES: Within normal limits.    IMPRESSION:     Multifocal bilateral infiltrates as described consistent with pneumonia in the appropriate clinical setting. An element of aspiration is considered.  No specific acute pathology abdomen and pelvis.  Moderate colonic ileus.          Critical Care time: 45 mins assessing presenting problems of acute illness that poses high probability of life threatening deterioration or end organ damage/dysfunction.  Medical decision making inculding Initiating plan of care, reviewing data, reviewing radiology,direct patient bedside evaluation and interpretation of vital signs, any necessary ventilator management , discusion with multidisciplinary team, discussing goals of care with patient/family, all non inclusive of procedures Patient is a 59y old  Female who presents with a chief complaint of cardiac arrest (30 Dec 2019 15:09)      BRIEF HOSPITAL COURSE:   Patient is a 59 year old female with a pmhx of depression, opioid abuse (on methadone), ovarian ca who was brought in 12/27/19 for a cardiac arrest. Per family at bedside patient had seizure like activity while at home and subsequently collapsed in front of family member. EMS called and patient found in vfib arrest. Approximate down time was 10 minutes. She was then intubated in field, ACLS protocol initiated, 4 epis and x 2 shocks given en route to [facility]. ROSC achieved. Patient then went into cardiac arrest again, ACLS initiated and ROSC achieved. Upon presentation to the ED she was found to have CT head with diffuse anoxic injury with generalized edema. CT chest/abd with aspiration pneumonitis and colonic ileus. She was started on hypothermia protocol given not meaningful neuro exam post cardiac arrest. Troponin was also 19 with diffuse ST segment depression on EKG. Started on heparin drip and admitted to [facility].     Events last 24 hours:   Anoxic brain injury   no brainstem reflexes   DNR  episode of torsades and afib that has resolved without intervention   terminal extubation on 1/1/2020     PAST MEDICAL & SURGICAL HISTORY:    Allergies    No Known Allergies    Intolerances      FAMILY HISTORY:  unable to assess    Review of Systems:  unable to assess      Medications:  piperacillin/tazobactam IVPB.. 3.375 Gram(s) IV Intermittent every 8 hours  norepinephrine Infusion 0.05 MICROgram(s)/kG/Min IV Continuous <Continuous>  ALBUTerol    90 MICROgram(s) HFA Inhaler 2 Puff(s) Inhalation every 6 hours  fentaNYL    Injectable 50 MICROGram(s) IV Push every 1 hour PRN  midazolam Injectable 2 milliGRAM(s) IV Push every 30 minutes PRN  valproate sodium IVPB 500 milliGRAM(s) IV Intermittent two times a day  heparin  Injectable 5000 Unit(s) SubCutaneous every 12 hours  pantoprazole  Injectable 40 milliGRAM(s) IV Push christiano  glucagon  Injectable 1 milliGRAM(s) IntraMuscular once PRN  dextrose 5% with potassium chloride 20 mEq/L 1000 milliLiter(s) IV Continuous <Continuous>  dextrose 5%. 1000 milliLiter(s) IV Continuous <Continuous>  chlorhexidine 0.12% Liquid 15 milliLiter(s) Oral Mucosa every 12 hours    Mode: AC/ CMV (Assist Control/ Continuous Mandatory Ventilation)  RR (machine): 16  TV (machine): 400  FiO2: 35  PEEP: 5  ITime: 1  MAP: 8  PIP: 22      ICU Vital Signs Last 24 Hrs  T(C): 35.6 (30 Dec 2019 12:30), Max: 36.4 (30 Dec 2019 04:00)  T(F): 96.1 (30 Dec 2019 12:30), Max: 97.5 (30 Dec 2019 04:00)  HR: 140 (30 Dec 2019 18:00) (62 - 152)  BP: 92/54 (30 Dec 2019 18:00) (69/41 - 193/100)  BP(mean): 66 (30 Dec 2019 18:00) (51 - 146)  ABP: 146/74 (30 Dec 2019 05:15) (92/51 - 152/76)  ABP(mean): 98 (30 Dec 2019 05:15) (69 - 113)  RR: 16 (30 Dec 2019 18:00) (16 - 16)  SpO2: 99% (30 Dec 2019 18:00) (95% - 100%)    ABG - ( 29 Dec 2019 02:53 )  pH, Arterial: x     pH, Blood: 7.35  /  pCO2: 41    /  pO2: 81    / HCO3: 22    / Base Excess: -2.8  /  SaO2: 95            I&O's Detail    29 Dec 2019 07:01  -  30 Dec 2019 07:00  --------------------------------------------------------  IN:    dextrose 5% + lactated ringers.: 1500 mL    heparin  Infusion.: 256 mL    lactated ringers.: 300 mL    norepinephrine Infusion: 335.6 mL    Solution: 150 mL    Solution: 50 mL    Solution: 200 mL  Total IN: 2791.6 mL    OUT:    Indwelling Catheter - Urethral: 5300 mL    Voided: 400 mL  Total OUT: 5700 mL    Total NET: -2908.4 mL      30 Dec 2019 07:01  -  30 Dec 2019 19:59  --------------------------------------------------------  IN:    dextrose 5% + lactated ringers.: 675 mL    dextrose 5% with potassium chloride 20 mEq/L: 375 mL    norepinephrine Infusion: 146.4 mL    Solution: 100 mL    Solution: 500 mL  Total IN: 1796.4 mL    OUT:    Indwelling Catheter - Urethral: 2400 mL  Total OUT: 2400 mL    Total NET: -603.6 mL        LABS:                        12.2   14.74 )-----------( 271      ( 30 Dec 2019 09:23 )             38.6     12-30    165<HH>  |  130<H>  |  12  ----------------------------<  149<H>  3.0<L>   |  28  |  1.15    Ca    9.2      30 Dec 2019 09:23  Phos  2.2     12-30  Mg     2.5     12-30    TPro  6.0  /  Alb  2.1<L>  /  TBili  0.2  /  DBili  x   /  AST  107<H>  /  ALT  109<H>  /  AlkPhos  133<H>  12-30      CARDIAC MARKERS ( 29 Dec 2019 06:09 )  4.294 ng/mL / x     / x     / x     / x          CAPILLARY BLOOD GLUCOSE      POCT Blood Glucose.: 120 mg/dL (30 Dec 2019 18:07)    PTT - ( 30 Dec 2019 02:20 )  PTT:69.8 sec    CULTURES:  Culture Results:   No growth to date. (12-27 @ 22:05)  Culture Results:   No growth to date. (12-27 @ 22:05)  Culture Results:   No growth (12-27 @ 21:52)      Physical Examination:    General: intubated    HEENT: Pupils fixed, dilated at 6mm    PULM: rhonchi in both lung fields     CVS: Regular rate and rhythm, no murmurs, rubs, or gallops    ABD: Soft, nondistended, nontender, normoactive bowel sounds, no masses    EXT: No edema, nontender    SKIN: Warm and well perfused, no rashes noted.    Neuro: no brainstem reflexes, no gag, corneal, of withdraw to pain     RADIOLOGY:   < from: CT Head No Cont (12.27.19 @ 15:28) >    INTERPRETATION:  INDICATION:  Altered mental status. Status post cardiac arrest.  TECHNIQUE:  A non contrast 2.5mm axial CT study of the brain was performed from skull base to vertex. Coronal and sagittal reformations were generated from the axial data.  COMPARISON EXAMINATION:  No prior    FINDINGS:      There is a diffuse anoxic changes to the brain. There is generalized loss of gray-white differentiation throughout both hemispheres with brain swelling. There is gyral and sulcal effacement with diminution in size of the cisternal and extracerebral spaces. Ventricles are midline.    Similar anoxic changes are also suggested in the brainstem and cerebellumwith decreasing extracerebral spaces and with loss of gray-white differentiation.    There is no obvious hematoma.    The patient is intubated.    IMPRESSION:    1)  diffuse anoxic changes to the brain with generalized cerebral edema and loss of gray-white differentiation. Follow-up MR imaging may be considered for further assessment, along with follow-up CT imaging.  2)  no obvious hemorrhage.    < from: CT Chest No Cont (12.27.19 @ 15:32) >  INTERPRETATION:  CLINICAL INFORMATION: Cardiac arrest    COMPARISON: None.    PROCEDURE:   CT of the Chest, Abdomen and Pelvis was performed without intravenous contrast.   Intravenous contrast: None.  Oral contrast: None.  Sagittal and coronal reformats were performed.    FINDINGS:    CHEST:     LUNGS AND LARGE AIRWAYS: Patent central airways. Endotracheal tube in position. Multifocal bilateral airspace and groundglass infiltrates in the lingula segment of left upper lobe left lower lobe and the right upper lobe presumptive multifocal pneumonia in appropriate clinical setting. Element of aspiration could be present. Dependent atelectasis left lower lobe.  PLEURA: No pleural effusion.  VESSELS: Nonaneurysmal  HEART: Heart size is normal. No pericardial effusion.  MEDIASTINUM AND BRYON: Scattered nonspecific subcentimeter mediastinal lymph nodes. Lack of IV contrast limits hilar evaluation  CHEST WALL AND LOWER NECK: Within normal limits.    ABDOMEN AND PELVIS:    LIVER: Within normal limits.  BILE DUCTS: Normal caliber.  GALLBLADDER: Within normal limits.  SPLEEN: Within normal limits.  PANCREAS: Within normal limits.  ADRENALS: Within normal limits.  KIDNEYS/URETERS: Within normal limits.    BLADDER: Partially decompressed with Wynne  REPRODUCTIVE ORGANS: Calcified uterine fibroid    BOWEL: No bowel obstruction. Moderate colonic ileus.. Appendix no evidence of appendicitis  PERITONEUM: No ascites.  VESSELS: Nonaneurysmal.  RETROPERITONEUM/LYMPH NODES: No lymphadenopathy.    ABDOMINAL WALL: Within normal limits.  BONES: Within normal limits.    IMPRESSION:     Multifocal bilateral infiltrates as described consistent with pneumonia in the appropriate clinical setting. An element of aspiration is considered.  No specific acute pathology abdomen and pelvis.  Moderate colonic ileus.          Critical Care time: 45 mins assessing presenting problems of acute illness that poses high probability of life threatening deterioration or end organ damage/dysfunction.  Medical decision making inculding Initiating plan of care, reviewing data, reviewing radiology,direct patient bedside evaluation and interpretation of vital signs, any necessary ventilator management , discusion with multidisciplinary team, discussing goals of care with patient/family, all non inclusive of procedures

## 2019-12-30 NOTE — PROGRESS NOTE ADULT - PROBLEM SELECTOR PLAN 1
brain stem reflexes are absent  neuro eval and follow up reviewed  ICU supportive care in effect  family wishes to wait a few ways prior to ventilator liberation and end of life care  s/p card arrest  labs and imaging reviewed  pt is DNR now  will follow  prognosis is GRAVE

## 2019-12-30 NOTE — PROGRESS NOTE ADULT - SUBJECTIVE AND OBJECTIVE BOX
HPI: 60 y/o F w/ pmh of opioid abuse, depression, ovarian ca, presented to [facility] earlier today in cardiac arrest. Pt unable to provide hx, no family present. Per EMS pt was reported having been talking to friends when she suddenly had a seizure and then stopped breathing, per EMS friends immediately called 911 and EMS found pt in vfib. ACLS protocol was started pt received x4 epi and 2 shock and ROSC was achieve while in route to the ED pt arrest again and came into the trauma bay in cardiac arrest. ROSC was achieved again during 1st pulse checked. During my evaluation in the ED pt was bradycardic 40-60s, SBP 60 for which she was given 3L of wide open w/ some improvement in fluids. Pt underwent PAN-SCAN to r/o any acute brain/cervical/chest/abd/pelvis pathology and admitted to the ICU for further tx and evaluation. No further information available at this time. Pt hospital course complicated by NSTEMI w/ +trops Tmax of 28 for which she was started on heparin drip that are down trending down.    24 hour events: Pt yesterday had non-sustain runs of VT/torsades and currently an DNR. Pt neuro exam is consisting with anxious brain injury. No major overnight event.    Review of Systems: Unable to obtain    T(F): 96.1 (12-30-19 @ 12:30), Max: 97.5 (12-29-19 @ 15:59)  HR: 77 (12-30-19 @ 13:29) (62 - 152)  BP: 148/113 (12-30-19 @ 11:00) (69/41 - 193/100)  RR: 16 (12-30-19 @ 11:00) (16 - 16)  SpO2: 99% (12-30-19 @ 13:29) (95% - 100%)  Wt(kg): --    Mode: AC/ CMV (Assist Control/ Continuous Mandatory Ventilation), RR (machine): 16, TV (machine): 400, FiO2: 35, PEEP: 5  12-29-19 @ 07:01  -  12-30-19 @ 07:00  --------------------------------------------------------  IN: 2791.6 mL / OUT: 5700 mL / NET: -2908.4 mL    12-30-19 @ 07:01  -  12-30-19 @ 15:09  --------------------------------------------------------  IN: 261.6 mL / OUT: 0 mL / NET: 261.6 mL        CAPILLARY BLOOD GLUCOSE      POCT Blood Glucose.: 131 mg/dL (30 Dec 2019 10:04)      I&O's Summary    29 Dec 2019 07:01  -  30 Dec 2019 07:00  --------------------------------------------------------  IN: 2791.6 mL / OUT: 5700 mL / NET: -2908.4 mL    30 Dec 2019 07:01  -  30 Dec 2019 15:10  --------------------------------------------------------  IN: 261.6 mL / OUT: 0 mL / NET: 261.6 mL        Physical Exam:   Neuro: obtunded, no gag reflux noted, not overbreathing the vent, no responds to verbal/tactile or painful stimuli   HEENT: pupils fixed and dilated b/l, +R. EJ line placed  Resp: Good air entry b/l, respiration synchronized with the vent  CVS: +RRR  Abd: BSx4, soft, nt/nd  Ext: no edema  Skin: warm/dry      Meds:  piperacillin/tazobactam IVPB.. IV Intermittent    norepinephrine Infusion IV Continuous    glucagon  Injectable IntraMuscular PRN    ALBUTerol    90 MICROgram(s) HFA Inhaler Inhalation    fentaNYL    Injectable IV Push PRN  midazolam Injectable IV Push PRN  propofol Infusion IV Continuous  valproate sodium IVPB IV Intermittent      heparin  Injectable SubCutaneous    pantoprazole  Injectable IV Push      dextrose 5% + lactated ringers. IV Continuous  dextrose 5%. IV Continuous      chlorhexidine 0.12% Liquid Oral Mucosa                              12.2   14.74 )-----------( 271      ( 30 Dec 2019 09:23 )             38.6       12-30    165<HH>  |  130<H>  |  12  ----------------------------<  149<H>  3.0<L>   |  28  |  1.15    Ca    9.2      30 Dec 2019 09:23  Phos  2.2     12-30  Mg     2.5     12-30    TPro  6.0  /  Alb  2.1<L>  /  TBili  0.2  /  DBili  x   /  AST  107<H>  /  ALT  109<H>  /  AlkPhos  133<H>  12-30      CARDIAC MARKERS ( 29 Dec 2019 06:09 )  4.294 ng/mL / x     / x     / x     / x          PT/INR - ( 28 Dec 2019 19:09 )   PT: 13.1 sec;   INR: 1.20 ratio         PTT - ( 30 Dec 2019 02:20 )  PTT:69.8 sec    .Blood Blood-Venous   No growth to date. -- 12-27 @ 22:05  .Urine Clean Catch (Midstream)   No growth -- 12-27 @ 21:52          CENTRAL LINE: N  NOBLE: Y  A-LINE: Y    GLOBAL ISSUE/BEST PRACTICE:  Analgesia: N  Sedation: Y  CAM-ICU: n/a  HOB elevation: Y  Stress ulcer prophylaxis: Y  VTE prophylaxis: Y  Glycemic control: Y  Nutrition: N    CODE STATUS: DNR

## 2019-12-30 NOTE — PROGRESS NOTE ADULT - ASSESSMENT
Seen s/p cardiac arrest.  Seized in the beginning of the symptoms, likely sec to Anoxic brain injury.   Absent Brain stem functions.  Anoxic Encephalopathy.  CT Head - diffuse anoxic changes to the brain with generalized cerebral edema and loss of gray-white differentiation. No obvious hemorrhage.  Positive Troponin, on IV Heparin.  On Depacon 500 mg IV Q12h.  Utox screen is positive for Methamphetamines.  D/w pt's , step daughter at bedside at length.   Grave Prognosis - Family understands well and in full agreement of management and plan.   As per  terminal weaning on 1/1/2020.  DNR papers signed.  Pt has NO CAPACITY sec to Anoxic Encephalopathy.  Would follow.

## 2019-12-30 NOTE — PROGRESS NOTE ADULT - SUBJECTIVE AND OBJECTIVE BOX
Chief Complaint: Cardiac arrest    Interval Events: Recurrent PMVT yesterday.    Review of Systems:  Unable to obtain    Physical Exam:  Vital Signs Last 24 Hrs  T(C): 36.4 (30 Dec 2019 04:00), Max: 36.4 (29 Dec 2019 15:59)  T(F): 97.5 (30 Dec 2019 04:00), Max: 97.5 (29 Dec 2019 15:59)  HR: 86 (30 Dec 2019 06:45) (62 - 152)  BP: 135/72 (30 Dec 2019 06:45) (69/41 - 193/100)  BP(mean): 90 (30 Dec 2019 06:45) (51 - 143)  RR: 16 (30 Dec 2019 06:45) (16 - 16)  SpO2: 99% (30 Dec 2019 06:45) (95% - 100%)  General: Intubated  HEENT: ET tube in place  Neck: No JVD, no carotid bruit  Lungs: Coarse bilaterally  CV: RRR, nl S1/S2, no M/R/G  Abdomen: S/NT/ND, +BS  Extremities: No LE edema, no cyanosis  Neuro: Sedated  Skin: No rash    Labs:    12-29    141  |  108  |  19  ----------------------------<  108<H>  3.8   |  25  |  1.13    Ca    8.1<L>      29 Dec 2019 06:09  Phos  3.2     12-29  Mg     2.3     12-29    TPro  5.1<L>  /  Alb  2.3<L>  /  TBili  0.3  /  DBili  x   /  AST  200<H>  /  ALT  161<H>  /  AlkPhos  131<H>  12-29                        10.7   15.51 )-----------( 322      ( 29 Dec 2019 06:09 )             32.7       Telemetry: Sinus rhythm, PVCs, brief episode of PMVT

## 2019-12-30 NOTE — PROGRESS NOTE ADULT - ASSESSMENT
Patient is a 59 year old female with a pmhx of depression, opioid abuse (on methadone), ovarian ca who was brought in 12/27/19 for a cardiac arrest, course complicated by anoxic brain injury, NSTEMI, afib with RVR, torsades, shock state, hypernatremia     Plan:  Neuro: CT brain and exam consistent with anoxic brain injury. likely brain herniation. off sedation, depacon for seizures, urine + for amphetamines and opiates, possible combo abuse of these drugs is cause of hypoxic cardiac arrest  Cardio: approx down time prior to hospital 10mins. now with hypotension after brain herniation several night ago. Actively titrate levophed to a MAP of 65, if episodes of afib with RVR persists will consider lidocaine load and infusion. NSTEMI upon arrival, troponin downtrending   Pulm:  titrate sats/fio2 to 92% and higher, pulmonary toilet and suction as needed, being treated for aspiration PNA, terminal extubation on 1/1  Gi: NPO  Renal: strict I/O, cont guillaume, fluids changed from NS to D5+LR, per family does not want to check labs in the am  Endo: no active issues   ID: cont zosyn for aspiration PNA, all other cultures negative to date   Heme: s/p heparin for NSTEMI, no need for further AC       Dispo: DNR, but still will treat with aggressive measures. plan for terminal extubation on 1/1 Patient is a 59 year old female with a pmhx of depression, opioid abuse (on methadone), ovarian ca who was brought in 12/27/19 for a cardiac arrest, course complicated by anoxic brain injury, NSTEMI, afib with RVR, torsades, shock state, hypernatremia     Plan:  Neuro: CT brain and exam consistent with anoxic brain injury. likely brain herniation. off sedation, depacon for seizures, urine + for amphetamines and opiates, possible combo of these drugs is cause of hypoxic cardiac arrest  Cardio: approx down time prior to hospital 10mins. now with hypotension after brain herniation several night ago. Actively titrate levophed to a MAP of 65, if episodes of afib with RVR persists will consider lidocaine load and infusion. NSTEMI upon arrival, troponin downtrending   Pulm:  titrate sats/fio2 to 92% and higher, pulmonary toilet and suction as needed, being treated for aspiration PNA, terminal extubation on 1/1  Gi: NPO  Renal: strict I/O, cont guillaume, fluids changed from NS to D5+LR, per family does not want to check labs in the am  Endo: no active issues   ID: cont zosyn for aspiration PNA, all other cultures negative to date   Heme: s/p heparin for NSTEMI, no need for further AC       Dispo: DNR, but still will treat with aggressive measures. plan for terminal extubation on 1/1

## 2019-12-31 NOTE — PROGRESS NOTE ADULT - ASSESSMENT
The patient is a 59 year old female with unknown past medical history who presents with cardiac arrest.    Plan:  - Echocardiogram with grossly normal LV systolic function  - Urine tox noted - positive for methamphetamine, opiates, and methadone  - Troponin peaked at 28 and trending down. Cannot exclude type 1 NSTEMI, but more likely enzymes are elevated due to arrest and poor cardiac perfusion at that time.  - Off of heparin drip  - Recurrent PMVT. Avoid bradycardia. Can start lidocaine drip if recurs/persists. Not a candidate for defibrillation due to DNR.  - SVT - AF vs. AT. Continue to monitor.  - Continue norepinephrine and wean as tolerated  - Mechanical ventilation  - Neurology follow-up regarding brain death  - Possible palliative extubation in the upcoming days

## 2019-12-31 NOTE — PROGRESS NOTE ADULT - ASSESSMENT
S/p cardiac arrest.  Seized in the beginning of the symptoms, likely sec to Anoxic brain injury.   All the modalities of Brain stem functions are absent.  Anoxic Encephalopathy.  CT Head - diffuse anoxic changes to the brain with generalized cerebral edema and loss of gray-white differentiation. No obvious hemorrhage.  Positive Troponin, on IV Heparin.  On Depacon 500 mg IV Q12h.  Spoke to  and step daughter on multiple occasions.  Grave Prognosis is explained to them  -they understand well.  Chances of recovery  - Zero  Pt has NO CAPACITY sec to Anoxic Encephalopathy.  Terminal weaning - tomorrow - 1/1/2020.  DNR.  D/w covering nurse.  Would follow.

## 2019-12-31 NOTE — PROGRESS NOTE ADULT - PROBLEM SELECTOR PLAN 1
card arrest  anoxic brain injury  pt is now DNR  life support in progress  prognosis very poor  fixed and dilated pupils  family aware of prognosis  likely planned for terminal / ventilator liberation in the coming days  family does not want serial labs  would DC tele / cardiac monitoring - will not be providing ACLS and or Shocks  emotional support  counseling  will follow  Opioids PRN  AED for seizure control - palliative measure

## 2019-12-31 NOTE — PROGRESS NOTE ADULT - SUBJECTIVE AND OBJECTIVE BOX
Chief Complaint: Cardiac arrest    Interval Events: Remains on norepinephrine.    Review of Systems:  Unable to obtain    Physical Exam:  Vital Signs Last 24 Hrs  T(C): 34.8 (31 Dec 2019 04:02), Max: 35.6 (30 Dec 2019 12:30)  T(F): 94.6 (31 Dec 2019 04:02), Max: 96.1 (30 Dec 2019 12:30)  HR: 65 (31 Dec 2019 08:45) (63 - 145)  BP: 148/89 (31 Dec 2019 08:45) (81/54 - 219/148)  BP(mean): 105 (31 Dec 2019 08:45) (63 - 167)  RR: 16 (31 Dec 2019 08:45) (16 - 17)  SpO2: 99% (31 Dec 2019 08:45) (96% - 100%)  General: Intubated  HEENT: ET tube in place  Neck: No JVD, no carotid bruit  Lungs: Coarse bilaterally  CV: RRR, nl S1/S2, no M/R/G  Abdomen: S/NT/ND, +BS  Extremities: No LE edema, no cyanosis  Neuro: Comatose  Skin: No rash    Labs:    12-30    165<HH>  |  130<H>  |  12  ----------------------------<  149<H>  3.0<L>   |  28  |  1.15    Ca    9.2      30 Dec 2019 09:23  Phos  2.2     12-30  Mg     2.5     12-30    TPro  6.0  /  Alb  2.1<L>  /  TBili  0.2  /  DBili  x   /  AST  107<H>  /  ALT  109<H>  /  AlkPhos  133<H>  12-30                        12.2   14.74 )-----------( 271      ( 30 Dec 2019 09:23 )             38.6       Telemetry: Sinus rhythm, PVCs, AT vs. AF

## 2019-12-31 NOTE — PROGRESS NOTE ADULT - SUBJECTIVE AND OBJECTIVE BOX
Neurology Follow up note    KESHIA HANEY 59y Female    HPI: 60 y/o F w/ pmh of opioid abuse, depression, ovarian ca, presented to [facility] earlier today in cardiac arrest. Pt unable to provide hx, no family present. Per EMS pt was reported having been talking to friends when she suddenly had a seizure and then stopped breathing, per EMS friends immediately called 911 and EMS found pt in vfib. ACLS protocol was started pt received x4 epi and 2 shock and ROSC was achieve while in route to the ED pt arrest again and came into the trauma bay in cardiac arrest. ROSC was achieved again during 1st pulse checked. During my evaluation in the ED pt was bradycardic 40-60s, SBP 60 for which she was given 3L of wide open w/ some improvement in fluids. Pt underwent PAN-SCAN to r/o any acute brain/cervical/chest/abd/pelvis pathology and admitted to the ICU for further tx and evaluation.     Interval History -    Patient is seen, chart was reviewed and case was discussed with the treatment team.      Vital Signs Last 24 Hrs  T(C): 34.5 (31 Dec 2019 08:20), Max: 35.6 (30 Dec 2019 12:30)  T(F): 94.1 (31 Dec 2019 08:20), Max: 96.1 (30 Dec 2019 12:30)  HR: 60 (31 Dec 2019 12:00) (60 - 145)  BP: 90/61 (31 Dec 2019 12:00) (81/54 - 219/148)  BP(mean): 70 (31 Dec 2019 12:00) (63 - 167)  RR: 16 (31 Dec 2019 12:00) (16 - 17)  SpO2: 95% (31 Dec 2019 12:00) (95% - 100%)    MEDICATIONS    chlorhexidine 0.12% Liquid 15 milliLiter(s) Oral Mucosa every 12 hours  dextrose 5% + lactated ringers. 1000 milliLiter(s) IV Continuous <Continuous>  fentaNYL    Injectable 25 MICROGram(s) IV Push every 30 minutes PRN  norepinephrine Infusion 0.03 MICROgram(s)/kG/Min IV Continuous <Continuous>  pantoprazole  Injectable 40 milliGRAM(s) IV Push daily  piperacillin/tazobactam IVPB.. 3.375 Gram(s) IV Intermittent every 8 hours  valproate sodium IVPB 500 milliGRAM(s) IV Intermittent two times a day    Allergies    No Known Allergies    Neurological Exam  -    Intubated on vent.  On Pressors.  Remains unresponsive to all stimuli.  No response to pain is seen. Pain inflicted at b/l Ear lobes.  Pupils are dilated and fixed at 5.5 mm - Not reacting to light.  B/L Corneal Reflexes  - absent  Doll's eyes  - Absent  Gag/cough reflexes   - absent.  Pt is NOT breathing over the vent. Set rate is 16, breathing at 16  Cold caloric test was done with Nurse Juju  - No response is seen.  No spontaneous body movements, Jerks seen.  Neck is supple.    LABS:    CBC Full  -  ( 30 Dec 2019 09:23 )  WBC Count : 14.74 K/uL  RBC Count : 3.89 M/uL  Hemoglobin : 12.2 g/dL  Hematocrit : 38.6 %  Platelet Count - Automated : 271 K/uL  Mean Cell Volume : 99.2 fl  Mean Cell Hemoglobin : 31.4 pg  Mean Cell Hemoglobin Concentration : 31.6 gm/dL    12-30    165<HH>  |  130<H>  |  12  ----------------------------<  149<H>  3.0<L>   |  28  |  1.15    Ca    9.2      30 Dec 2019 09:23  Phos  2.2     12-30  Mg     2.5     12-30    TPro  6.0  /  Alb  2.1<L>  /  TBili  0.2  /  DBili  x   /  AST  107<H>  /  ALT  109<H>  /  AlkPhos  133<H>  12-30    RADIOLOGY & ADDITIONAL STUDIES:    < from: CT Head No Cont (12.27.19 @ 15:28) >    IMPRESSION:    1)  diffuse anoxic changes to the brain with generalized cerebral edema and loss of gray-white differentiation. Follow-up MR imaging may be considered for further assessment, along with follow-up CT imaging.  2)  no obvious hemorrhage.    < end of copied text >    < from: CT Cervical Spine No Cont (12.27.19 @ 15:28) >    IMPRESSION:    No acute fracture identified. Multilevel degenerative changes.    < end of copied text >

## 2019-12-31 NOTE — PROGRESS NOTE ADULT - ATTENDING COMMENTS
31 minutes of critical care time spent with the patient and coordinating care with nursing, hospitalist, and consultants. Patient is critically ill requiring ICU care due to cardiac arrest, respiratory failure, NSTEMI, PMVT, anoxic brain injury. The patient is high risk for deterioration and death.

## 2019-12-31 NOTE — PROGRESS NOTE ADULT - SUBJECTIVE AND OBJECTIVE BOX
Date/Time Patient Seen:  		  Referring MD:   Data Reviewed	       Patient is a 59y old  Female who presents with a chief complaint of cardiac arrest (30 Dec 2019 19:58)      Subjective/HPI     PAST MEDICAL & SURGICAL HISTORY:        Medication list         MEDICATIONS  (STANDING):  ALBUTerol    90 MICROgram(s) HFA Inhaler 2 Puff(s) Inhalation every 6 hours  chlorhexidine 0.12% Liquid 15 milliLiter(s) Oral Mucosa every 12 hours  dextrose 5% + lactated ringers. 1000 milliLiter(s) (75 mL/Hr) IV Continuous <Continuous>  dextrose 5%. 1000 milliLiter(s) (50 mL/Hr) IV Continuous <Continuous>  heparin  Injectable 5000 Unit(s) SubCutaneous every 12 hours  norepinephrine Infusion 0.05 MICROgram(s)/kG/Min (6.094 mL/Hr) IV Continuous <Continuous>  pantoprazole  Injectable 40 milliGRAM(s) IV Push daily  piperacillin/tazobactam IVPB.. 3.375 Gram(s) IV Intermittent every 8 hours  valproate sodium IVPB 500 milliGRAM(s) IV Intermittent two times a day    MEDICATIONS  (PRN):  glucagon  Injectable 1 milliGRAM(s) IntraMuscular once PRN Glucose LESS THAN 70 milligrams/deciliter         Vitals log        ICU Vital Signs Last 24 Hrs  T(C): 34.8 (31 Dec 2019 04:02), Max: 36.2 (30 Dec 2019 08:30)  T(F): 94.6 (31 Dec 2019 04:02), Max: 97.2 (30 Dec 2019 08:30)  HR: 65 (31 Dec 2019 05:00) (65 - 145)  BP: 172/106 (31 Dec 2019 05:00) (81/54 - 219/148)  BP(mean): 122 (31 Dec 2019 05:00) (63 - 167)  ABP: --  ABP(mean): --  RR: 16 (31 Dec 2019 05:00) (16 - 17)  SpO2: 99% (31 Dec 2019 05:00) (96% - 100%)       Mode: AC/ CMV (Assist Control/ Continuous Mandatory Ventilation)  RR (machine): 16  TV (machine): 400  FiO2: 35  PEEP: 5  ITime: 1  MAP: 8.6  PIP: 22      Input and Output:  I&O's Detail    29 Dec 2019 07:01  -  30 Dec 2019 07:00  --------------------------------------------------------  IN:    dextrose 5% + lactated ringers.: 1500 mL    heparin  Infusion.: 256 mL    lactated ringers.: 300 mL    norepinephrine Infusion: 335.6 mL    Solution: 150 mL    Solution: 50 mL    Solution: 200 mL  Total IN: 2791.6 mL    OUT:    Indwelling Catheter - Urethral: 5300 mL    Voided: 400 mL  Total OUT: 5700 mL    Total NET: -2908.4 mL      30 Dec 2019 07:01  -  31 Dec 2019 06:06  --------------------------------------------------------  IN:    dextrose 5% + lactated ringers.: 675 mL    dextrose 5% with potassium chloride 20 mEq/L: 625 mL    norepinephrine Infusion: 170.8 mL    Solution: 100 mL    Solution: 749.9 mL  Total IN: 2320.7 mL    OUT:    Indwelling Catheter - Urethral: 3900 mL  Total OUT: 3900 mL    Total NET: -1579.3 mL          Lab Data                        12.2   14.74 )-----------( 271      ( 30 Dec 2019 09:23 )             38.6     12-30    165<HH>  |  130<H>  |  12  ----------------------------<  149<H>  3.0<L>   |  28  |  1.15    Ca    9.2      30 Dec 2019 09:23  Phos  2.2     12-30  Mg     2.5     12-30    TPro  6.0  /  Alb  2.1<L>  /  TBili  0.2  /  DBili  x   /  AST  107<H>  /  ALT  109<H>  /  AlkPhos  133<H>  12-30      CARDIAC MARKERS ( 29 Dec 2019 06:09 )  4.294 ng/mL / x     / x     / x     / x            Review of Systems	      Objective     Physical Examination    head at  heart s1s2  lung dec BS  friend at BS    Pertinent Lab findings & Imaging      Wynne:  NO   Adequate UO     I&O's Detail    29 Dec 2019 07:01  -  30 Dec 2019 07:00  --------------------------------------------------------  IN:    dextrose 5% + lactated ringers.: 1500 mL    heparin  Infusion.: 256 mL    lactated ringers.: 300 mL    norepinephrine Infusion: 335.6 mL    Solution: 150 mL    Solution: 50 mL    Solution: 200 mL  Total IN: 2791.6 mL    OUT:    Indwelling Catheter - Urethral: 5300 mL    Voided: 400 mL  Total OUT: 5700 mL    Total NET: -2908.4 mL      30 Dec 2019 07:01  -  31 Dec 2019 06:06  --------------------------------------------------------  IN:    dextrose 5% + lactated ringers.: 675 mL    dextrose 5% with potassium chloride 20 mEq/L: 625 mL    norepinephrine Infusion: 170.8 mL    Solution: 100 mL    Solution: 749.9 mL  Total IN: 2320.7 mL    OUT:    Indwelling Catheter - Urethral: 3900 mL  Total OUT: 3900 mL    Total NET: -1579.3 mL               Discussed with:     Cultures:	        Radiology

## 2019-12-31 NOTE — PROGRESS NOTE ADULT - ASSESSMENT
60 y/o F w/ pmh of opioid abuse, depression, ovarian ca, presented to [facility] earlier today in cardiac arrest with ROSC. Pt now admitted to the [facility].    Cardiac Arrest   Etiology unknown but suspect Methamphetamine OD   Suspect brain death with fixed and dilated pupils   Possible terminal extubation  tomorrow  fix levophed dose, limit unnecessary labs and sticks.   Having arrhythmias on monitor - likely related to cardiac ischemia from original event.   CC: Dr. Ramírez; Cardiology: Dr. Corley    Acute Respiratory Failure   Possible Aspiration; IV Zosyn;    Intubated   Follow up cultures     Transaminitis   Most likely from shock liver with possible underlying liver disease     Diet  NPO    DVT Prophylaxis  Heparin SC     Disposition  Terminal extubation possible when family ready - need emotional support.   There is major suspicion for brain herniation and brain death; prognosis is very grave

## 2019-12-31 NOTE — PROGRESS NOTE ADULT - SUBJECTIVE AND OBJECTIVE BOX
59y  Female  No Known Allergies    CC: Patient is a 59y old  Female who presents with a chief complaint of cardiac arrest     HPI: 59 year old female with a history of opioid abuse, depression, ovarian ca, presented to ER cardiac arrest. Apparently she had been talking to friends when she suddenly had a seizure and then stopped breathing, On arrival  EMS found pt in vfib. ACLS protocol was started and ROSC was achieve while in route to the ER pt arrested a second time and came in cardiac arrest and ROSC was achieved again. In the initial stages she was hypotensive and bradycardic but inroved with fluids and stabilized from a cardiac perspective but is now showing sign of anoxic brain injury with most recent CT showing loss of grey/white differentiation and global edema.      Vital Signs Last 24 Hrs  T(C): 35.6 (31 Dec 2019 16:00), Max: 35.6 (31 Dec 2019 16:00)  T(F): 96 (31 Dec 2019 16:00), Max: 96 (31 Dec 2019 16:00)  HR: 59 (31 Dec 2019 19:00) (59 - 92)  BP: 96/71 (31 Dec 2019 19:00) (81/54 - 219/148)  BP(mean): 79 (31 Dec 2019 19:00) (63 - 167)  RR: 16 (31 Dec 2019 19:00) (16 - 17)  SpO2: 99% (31 Dec 2019 19:00) (95% - 100%)    I&O's Summary  30 Dec 2019 07:01  -  31 Dec 2019 07:00  --------------------------------------------------------  IN: 2320.7 mL / OUT: 4400 mL / NET: -2079.3 mL    31 Dec 2019 07:01  -  31 Dec 2019 19:23  --------------------------------------------------------  IN: 1116.2 mL / OUT: 1000 mL / NET: 116.2 mL      LABS  12-30    165<HH>  |  130<H>  |  12  ----------------------------<  149<H>  3.0<L>   |  28  |  1.15    Ca    9.2      30 Dec 2019 09:23  Phos  2.2     12-30  Mg     2.5     12-30  TPro  6.0  /  Alb  2.1<L>  /  TBili  0.2  /  DBili  x   /  AST  107<H>  /  ALT  109<H>  /  AlkPhos  133<H>  12-30                        12.2   14.74 )-----------( 271      ( 30 Dec 2019 09:23 )             38.6   PTT - ( 30 Dec 2019 02:20 )  PTT:69.8 sec    LIVER FUNCTIONS - ( 30 Dec 2019 09:23 )  Alb: 2.1 g/dL / Pro: 6.0 g/dL / ALK PHOS: 133 U/L / ALT: 109 U/L DA / AST: 107 U/L / GGT: x           CAPILLARY BLOOD GLUCOSE  POCT Blood Glucose.: 119 mg/dL (31 Dec 2019 06:55)    VENT SETTINGS   Mode: AC/ CMV (Assist Control/ Continuous Mandatory Ventilation)  RR (machine): 16  TV (machine): 400  FiO2: 35  PEEP: 5  ITime: 1  MAP: 9  PIP: 16      Meds  MEDICATIONS  (STANDING):  chlorhexidine 0.12% Liquid 15 milliLiter(s) Oral Mucosa every 12 hours  dextrose 5% + lactated ringers. 1000 milliLiter(s) (75 mL/Hr) IV Continuous <Continuous>  norepinephrine Infusion 0.03 MICROgram(s)/kG/Min (3.656 mL/Hr) IV Continuous <Continuous>  pantoprazole  Injectable 40 milliGRAM(s) IV Push daily  piperacillin/tazobactam IVPB.. 3.375 Gram(s) IV Intermittent every 8 hours  valproate sodium IVPB 500 milliGRAM(s) IV Intermittent two times a day      REVIEW OF SYSTEMS:    Unable to obtain due to mechnical ventilation, sedation, poor mental status     Physicial Exam:     Constitutional: post cardiac arrest with anoxic brain injury secondary to hypoxia   Neck: No bruits; no thyromegaly or nodules,  No JVD  Respiratory: Breath Sounds equal & clear to percussion & auscultation, no accessory muscle use  Cardiovascular: Regular rate & rhythm, normal S1, S2; no murmurs, gallops or rubs; no S3, S4  Gastrointestinal: Soft, non-tender, non distended no hepatosplenomegaly, normal bowel sounds  Extremities: No peripheral edema, No cyanosis, clubbing   Vascular: Equal and normal pulses: 2+ peripheral pulses throughout  Neurological: unresponsive to verbal and painful stimuli no sensory or motor response  Musculoskeletal: No joint  swelling or deformity  Skin: No rashes

## 2019-12-31 NOTE — PROGRESS NOTE ADULT - SUBJECTIVE AND OBJECTIVE BOX
Patient is a 59y old  Female who presents with a chief complaint of cardiac arrest (31 Dec 2019 06:06)    INTERVAL HPI/OVERNIGHT EVENTS: events noted.     MEDICATIONS  (STANDING):  chlorhexidine 0.12% Liquid 15 milliLiter(s) Oral Mucosa every 12 hours  dextrose 5% + lactated ringers. 1000 milliLiter(s) (75 mL/Hr) IV Continuous <Continuous>  norepinephrine Infusion 0.03 MICROgram(s)/kG/Min (3.656 mL/Hr) IV Continuous <Continuous>  pantoprazole  Injectable 40 milliGRAM(s) IV Push daily  piperacillin/tazobactam IVPB.. 3.375 Gram(s) IV Intermittent every 8 hours  valproate sodium IVPB 500 milliGRAM(s) IV Intermittent two times a day    MEDICATIONS  (PRN):  fentaNYL    Injectable 25 MICROGram(s) IV Push every 30 minutes PRN pain, dyspnea, distress, suffering,      Allergies  No Known Allergies      REVIEW OF SYSTEMS:  unable to obtain    Vital Signs Last 24 Hrs  T(C): 34.5 (31 Dec 2019 08:20), Max: 35.6 (30 Dec 2019 12:30)  T(F): 94.1 (31 Dec 2019 08:20), Max: 96.1 (30 Dec 2019 12:30)  HR: 64 (31 Dec 2019 09:15) (63 - 145)  BP: 129/82 (31 Dec 2019 09:15) (81/54 - 219/148)  BP(mean): 97 (31 Dec 2019 09:15) (63 - 167)  RR: 16 (31 Dec 2019 09:15) (16 - 17)  SpO2: 99% (31 Dec 2019 09:15) (96% - 100%)    PHYSICAL EXAM:  GENERAL: intubated  EYES: fixed pupils.   ENMT: Moist mucous membranes  NERVOUS SYSTEM:  unresponsive to stimuli, no spontaneous movement.  CHEST/LUNG: Clear to auscultation bilaterally;   HEART: Regular rate and rhythm;  ABDOMEN: Soft, Nontender, Nondistended; hypoactive BS  EXTREMITIES:  2+ Peripheral Pulses, No edema    LABS:    Ca    9.2        30 Dec 2019 09:23      PTT - ( 30 Dec 2019 02:20 )  PTT:69.8 sec    CAPILLARY BLOOD GLUCOSE  POCT Blood Glucose.: 119 mg/dL (31 Dec 2019 06:55)  POCT Blood Glucose.: 120 mg/dL (30 Dec 2019 18:07)      RADIOLOGY & ADDITIONAL TESTS:    Imaging Personally Reviewed:  [ ] YES     Consultant(s) Notes Reviewed:  card, pulm    Care Discussed with Consultants/Other Providers:    Advanced Directives: [ ] DNR  [ ] No feeding tube  [ ] MOLST in chart  [ ] MOLST completed today  [ ] Unknown

## 2019-12-31 NOTE — PROGRESS NOTE ADULT - ASSESSMENT
59 year old female post cardiac arrest with ROSC now with anoxic Encephalopathy, NSTEMI     Plan:   continue Depacon 500 mg IV Q12h.  Grave ton no prognosis   family aware and plans for terminal wean tomorrow   NSTEMI/Positive Troponin, off IV Heparin  elevated enzymes liekly secondary to cardiac arrest   post arrest eccho shows abnormal LV function   continue Levophed, will titrate for MAP 65-70 and goal UOP >0.5 cc/kg/hr  has episodes of bradycardia but deemed not a candidate for defibrillation due to DNR    Time spent: 30 mins assessing presenting problems of acute illness that poses high probability  end organ damage/dysfunction.  Medical decision making inculding plan of daily care, reviewing data, reviewing radiology, discussing with multidisciplinary team, non inclusive of procedures, discussing goals of care with patient/family 59 year old female post cardiac arrest with ROSC now with anoxic Encephalopathy, NSTEMI     Plan:   continue Depacon 500 mg IV Q12h.  Grave ton no prognosis   family aware and plans for terminal wean from mechanical ventilation  tomorrow   NSTEMI/Positive Troponin, off IV Heparin  elevated enzymes liekly secondary to cardiac arrest   post arrest eccho shows abnormal LV function   continue Levophed, will titrate for MAP 65-70 and goal UOP >0.5 cc/kg/hr  has episodes of bradycardia but deemed not a candidate for defibrillation due to DNR    Time spent: 30 mins assessing presenting problems of acute illness that poses high probability  end organ damage/dysfunction.  Medical decision making inculding plan of daily care, reviewing data, reviewing radiology, discussing with multidisciplinary team, non inclusive of procedures, discussing goals of care with patient/family

## 2020-01-01 VITALS
RESPIRATION RATE: 16 BRPM | HEART RATE: 65 BPM | SYSTOLIC BLOOD PRESSURE: 73 MMHG | DIASTOLIC BLOOD PRESSURE: 53 MMHG | OXYGEN SATURATION: 95 %

## 2020-01-01 LAB
CULTURE RESULTS: SIGNIFICANT CHANGE UP
CULTURE RESULTS: SIGNIFICANT CHANGE UP
SPECIMEN SOURCE: SIGNIFICANT CHANGE UP
SPECIMEN SOURCE: SIGNIFICANT CHANGE UP

## 2020-01-01 PROCEDURE — 83880 ASSAY OF NATRIURETIC PEPTIDE: CPT

## 2020-01-01 PROCEDURE — 81001 URINALYSIS AUTO W/SCOPE: CPT

## 2020-01-01 PROCEDURE — 99285 EMERGENCY DEPT VISIT HI MDM: CPT | Mod: 25

## 2020-01-01 PROCEDURE — 93005 ELECTROCARDIOGRAM TRACING: CPT

## 2020-01-01 PROCEDURE — 36600 WITHDRAWAL OF ARTERIAL BLOOD: CPT

## 2020-01-01 PROCEDURE — 71045 X-RAY EXAM CHEST 1 VIEW: CPT

## 2020-01-01 PROCEDURE — 93306 TTE W/DOPPLER COMPLETE: CPT

## 2020-01-01 PROCEDURE — 80307 DRUG TEST PRSMV CHEM ANLYZR: CPT

## 2020-01-01 PROCEDURE — 36415 COLL VENOUS BLD VENIPUNCTURE: CPT

## 2020-01-01 PROCEDURE — 93970 EXTREMITY STUDY: CPT

## 2020-01-01 PROCEDURE — 87086 URINE CULTURE/COLONY COUNT: CPT

## 2020-01-01 PROCEDURE — 94002 VENT MGMT INPAT INIT DAY: CPT

## 2020-01-01 PROCEDURE — 83605 ASSAY OF LACTIC ACID: CPT

## 2020-01-01 PROCEDURE — 70450 CT HEAD/BRAIN W/O DYE: CPT

## 2020-01-01 PROCEDURE — 94770: CPT

## 2020-01-01 PROCEDURE — 84100 ASSAY OF PHOSPHORUS: CPT

## 2020-01-01 PROCEDURE — 87040 BLOOD CULTURE FOR BACTERIA: CPT

## 2020-01-01 PROCEDURE — 83036 HEMOGLOBIN GLYCOSYLATED A1C: CPT

## 2020-01-01 PROCEDURE — 83735 ASSAY OF MAGNESIUM: CPT

## 2020-01-01 PROCEDURE — 84436 ASSAY OF TOTAL THYROXINE: CPT

## 2020-01-01 PROCEDURE — 84443 ASSAY THYROID STIM HORMONE: CPT

## 2020-01-01 PROCEDURE — 84145 PROCALCITONIN (PCT): CPT

## 2020-01-01 PROCEDURE — 99239 HOSP IP/OBS DSCHRG MGMT >30: CPT

## 2020-01-01 PROCEDURE — 82803 BLOOD GASES ANY COMBINATION: CPT

## 2020-01-01 PROCEDURE — 94640 AIRWAY INHALATION TREATMENT: CPT

## 2020-01-01 PROCEDURE — 86803 HEPATITIS C AB TEST: CPT

## 2020-01-01 PROCEDURE — 84484 ASSAY OF TROPONIN QUANT: CPT

## 2020-01-01 PROCEDURE — 94003 VENT MGMT INPAT SUBQ DAY: CPT

## 2020-01-01 PROCEDURE — 85610 PROTHROMBIN TIME: CPT

## 2020-01-01 PROCEDURE — 84480 ASSAY TRIIODOTHYRONINE (T3): CPT

## 2020-01-01 PROCEDURE — 80053 COMPREHEN METABOLIC PANEL: CPT

## 2020-01-01 PROCEDURE — 74176 CT ABD & PELVIS W/O CONTRAST: CPT

## 2020-01-01 PROCEDURE — 85730 THROMBOPLASTIN TIME PARTIAL: CPT

## 2020-01-01 PROCEDURE — 71250 CT THORAX DX C-: CPT

## 2020-01-01 PROCEDURE — 85027 COMPLETE CBC AUTOMATED: CPT

## 2020-01-01 PROCEDURE — 94799 UNLISTED PULMONARY SVC/PX: CPT

## 2020-01-01 PROCEDURE — 82962 GLUCOSE BLOOD TEST: CPT

## 2020-01-01 PROCEDURE — 83690 ASSAY OF LIPASE: CPT

## 2020-01-01 PROCEDURE — 72125 CT NECK SPINE W/O DYE: CPT

## 2020-01-01 RX ADMIN — PANTOPRAZOLE SODIUM 40 MILLIGRAM(S): 20 TABLET, DELAYED RELEASE ORAL at 11:17

## 2020-01-01 RX ADMIN — PIPERACILLIN AND TAZOBACTAM 25 GRAM(S): 4; .5 INJECTION, POWDER, LYOPHILIZED, FOR SOLUTION INTRAVENOUS at 08:07

## 2020-01-01 RX ADMIN — CHLORHEXIDINE GLUCONATE 15 MILLILITER(S): 213 SOLUTION TOPICAL at 05:53

## 2020-01-01 RX ADMIN — Medication 27.5 MILLIGRAM(S): at 05:53

## 2020-01-01 NOTE — PROGRESS NOTE ADULT - SUBJECTIVE AND OBJECTIVE BOX
Date/Time Patient Seen:  		  Referring MD:   Data Reviewed	       Patient is a 59y old  Female who presents with a chief complaint of cardiac arrest (31 Dec 2019 19:22)      Subjective/HPI     PAST MEDICAL & SURGICAL HISTORY:        Medication list         MEDICATIONS  (STANDING):  chlorhexidine 0.12% Liquid 15 milliLiter(s) Oral Mucosa every 12 hours  dextrose 5% + lactated ringers. 1000 milliLiter(s) (75 mL/Hr) IV Continuous <Continuous>  norepinephrine Infusion 0.03 MICROgram(s)/kG/Min (3.656 mL/Hr) IV Continuous <Continuous>  pantoprazole  Injectable 40 milliGRAM(s) IV Push daily  piperacillin/tazobactam IVPB.. 3.375 Gram(s) IV Intermittent every 8 hours  valproate sodium IVPB 500 milliGRAM(s) IV Intermittent two times a day    MEDICATIONS  (PRN):  fentaNYL    Injectable 25 MICROGram(s) IV Push every 30 minutes PRN pain, dyspnea, distress, suffering,         Vitals log        ICU Vital Signs Last 24 Hrs  T(C): 35.3 (01 Jan 2020 04:00), Max: 35.6 (31 Dec 2019 16:00)  T(F): 95.6 (01 Jan 2020 04:00), Max: 96 (31 Dec 2019 16:00)  HR: 55 (01 Jan 2020 06:00) (55 - 146)  BP: 103/70 (01 Jan 2020 06:00) (66/36 - 224/184)  BP(mean): 81 (01 Jan 2020 06:00) (46 - 196)  ABP: --  ABP(mean): --  RR: 16 (01 Jan 2020 06:00) (16 - 16)  SpO2: 98% (01 Jan 2020 06:00) (93% - 100%)       Mode: AC/ CMV (Assist Control/ Continuous Mandatory Ventilation)  RR (machine): 16  TV (machine): 400  FiO2: 35  PEEP: 5  ITime: 1  MAP: 9  PIP: 17      Input and Output:  I&O's Detail    30 Dec 2019 07:01  -  31 Dec 2019 07:00  --------------------------------------------------------  IN:    dextrose 5% + lactated ringers.: 675 mL    dextrose 5% with potassium chloride 20 mEq/L: 625 mL    norepinephrine Infusion: 170.8 mL    Solution: 100 mL    Solution: 749.9 mL  Total IN: 2320.7 mL    OUT:    Indwelling Catheter - Urethral: 4400 mL  Total OUT: 4400 mL    Total NET: -2079.3 mL      31 Dec 2019 07:01  -  01 Jan 2020 06:07  --------------------------------------------------------  IN:    dextrose 5% + lactated ringers.: 1725 mL    norepinephrine Infusion: 12 mL    norepinephrine Infusion: 80.5 mL    Solution: 275 mL  Total IN: 2092.5 mL    OUT:    Indwelling Catheter - Urethral: 1500 mL  Total OUT: 1500 mL    Total NET: 592.5 mL          Lab Data                        12.2   14.74 )-----------( 271      ( 30 Dec 2019 09:23 )             38.6     12-30    165<HH>  |  130<H>  |  12  ----------------------------<  149<H>  3.0<L>   |  28  |  1.15    Ca    9.2      30 Dec 2019 09:23  Phos  2.2     12-30  Mg     2.5     12-30    TPro  6.0  /  Alb  2.1<L>  /  TBili  0.2  /  DBili  x   /  AST  107<H>  /  ALT  109<H>  /  AlkPhos  133<H>  12-30            Review of Systems	      Objective     Physical Examination    head at  heart s1s2  lung dec BS  abd soft  et tube      Pertinent Lab findings & Imaging      Ricci:  NO   Adequate UO     I&O's Detail    30 Dec 2019 07:01  -  31 Dec 2019 07:00  --------------------------------------------------------  IN:    dextrose 5% + lactated ringers.: 675 mL    dextrose 5% with potassium chloride 20 mEq/L: 625 mL    norepinephrine Infusion: 170.8 mL    Solution: 100 mL    Solution: 749.9 mL  Total IN: 2320.7 mL    OUT:    Indwelling Catheter - Urethral: 4400 mL  Total OUT: 4400 mL    Total NET: -2079.3 mL      31 Dec 2019 07:01  -  01 Jan 2020 06:07  --------------------------------------------------------  IN:    dextrose 5% + lactated ringers.: 1725 mL    norepinephrine Infusion: 12 mL    norepinephrine Infusion: 80.5 mL    Solution: 275 mL  Total IN: 2092.5 mL    OUT:    Indwelling Catheter - Urethral: 1500 mL  Total OUT: 1500 mL    Total NET: 592.5 mL               Discussed with:     Cultures:	        Radiology

## 2020-01-01 NOTE — PROGRESS NOTE ADULT - SUBJECTIVE AND OBJECTIVE BOX
Chief Complaint: Cardiac arrest    Interval Events: Remains on norepinephrine.     Review of Systems:  Unable to obtain    Physical Exam:  Vital Signs Last 24 Hrs  T(C): 35.3 (01 Jan 2020 04:00), Max: 35.6 (31 Dec 2019 16:00)  T(F): 95.6 (01 Jan 2020 04:00), Max: 96 (31 Dec 2019 16:00)  HR: 56 (01 Jan 2020 08:00) (55 - 146)  BP: 92/63 (01 Jan 2020 08:00) (66/36 - 224/184)  BP(mean): 74 (01 Jan 2020 08:00) (46 - 196)  RR: 16 (01 Jan 2020 08:00) (16 - 16)  SpO2: 98% (01 Jan 2020 08:00) (93% - 100%)  General: Intubated  HEENT: ET tube in place  Neck: No JVD, no carotid bruit  Lungs: Coarse bilaterally  CV: RRR, nl S1/S2, no M/R/G  Abdomen: S/NT/ND, +BS  Extremities: No LE edema, no cyanosis  Neuro: Comatose  Skin: No rash    Labs:    12-30    165<HH>  |  130<H>  |  12  ----------------------------<  149<H>  3.0<L>   |  28  |  1.15    Ca    9.2      30 Dec 2019 09:23  Phos  2.2     12-30  Mg     2.5     12-30    TPro  6.0  /  Alb  2.1<L>  /  TBili  0.2  /  DBili  x   /  AST  107<H>  /  ALT  109<H>  /  AlkPhos  133<H>  12-30                        12.2   14.74 )-----------( 271      ( 30 Dec 2019 09:23 )             38.6     Telemetry: Sinus rhythm, PVCs

## 2020-01-01 NOTE — DISCHARGE NOTE FOR THE EXPIRED PATIENT - HOSPITAL COURSE
59F Opioid Abuse, Depression, Ovarian Cancer was admitted to [facility] on 12/27/2019 after having Cardiac Arrest x 2.  CPR was performed and ROSC was eventually achieved.  Patient was brought to our ER where she was intubated and transferred to the ICU.  Labs did reveal her Urine Toxicology being positive for Methamphetamines.   and family stated she was talking normally and had a seizure.  They did admit to her having a substance issue problem.  Patient was managed with IV Fluids and Antibiotics for several days however continued to deteriorate.  Head CT did reveal anoxic brain injury and Neurology evaluation revealed absent brain stem function. After conversation with family decision to withdraw care was made and patient was terminally extubated and passed away at 2:34 PM.  Patients family was notified.     The Medical Examiners office was also notified and they have accepted the case.

## 2020-01-01 NOTE — PROGRESS NOTE ADULT - SUBJECTIVE AND OBJECTIVE BOX
Neurology Follow up note    KESHIA HANEY 59y Female    HPI: 60 y/o F w/ pmh of opioid abuse, depression, ovarian ca, presented to [facility] earlier today in cardiac arrest. Pt unable to provide hx, no family present. Per EMS pt was reported having been talking to friends when she suddenly had a seizure and then stopped breathing, per EMS friends immediately called 911 and EMS found pt in vfib. ACLS protocol was started pt received x4 epi and 2 shock and ROSC was achieve while in route to the ED pt arrest again and came into the trauma bay in cardiac arrest. ROSC was achieved again during 1st pulse checked. During my evaluation in the ED pt was bradycardic 40-60s, SBP 60 for which she was given 3L of wide open w/ some improvement in fluids. Pt underwent PAN-SCAN to r/o any acute brain/cervical/chest/abd/pelvis pathology and admitted to the ICU for further tx and evaluation. No further information available at this time. (27 Dec 2019 17:29)    Interval History -    Patient is seen, chart was reviewed and case was discussed with the treatment team.  Pt is not in any distress.   Lying on bed comfortably.     Vital Signs Last 24 Hrs  T(C): 35.8 (01 Jan 2020 12:13), Max: 35.8 (01 Jan 2020 12:13)  T(F): 96.5 (01 Jan 2020 12:13), Max: 96.5 (01 Jan 2020 12:13)  HR: 63 (01 Jan 2020 12:00) (55 - 146)  BP: 89/65 (01 Jan 2020 12:00) (66/36 - 224/184)  BP(mean): 73 (01 Jan 2020 12:00) (46 - 196)  RR: 16 (01 Jan 2020 12:00) (16 - 16)  SpO2: 97% (01 Jan 2020 12:00) (93% - 100%)    MEDICATIONS    chlorhexidine 0.12% Liquid 15 milliLiter(s) Oral Mucosa every 12 hours  dextrose 5% + lactated ringers. 1000 milliLiter(s) IV Continuous <Continuous>  fentaNYL    Injectable 25 MICROGram(s) IV Push every 30 minutes PRN  norepinephrine Infusion 0.03 MICROgram(s)/kG/Min IV Continuous <Continuous>  pantoprazole  Injectable 40 milliGRAM(s) IV Push daily  piperacillin/tazobactam IVPB.. 3.375 Gram(s) IV Intermittent every 8 hours  valproate sodium IVPB 500 milliGRAM(s) IV Intermittent two times a day    Allergies    No Known Allergies    Neurological Exam  -    Intubated on vent.  On Levophed.  Remains unresponsive to all stimuli.  No response to pain is seen. Pain inflicted at b/l Ear lobes.  Pupils are dilated and fixed at 5.5 mm - Not reacting to light.  B/L Corneal Reflexes  - absent  Doll's eyes  - Absent  Gag/cough reflexes   - absent.  Pt is NOT breathing over the vent. Set rate is 16, breathing at 16  Cold caloric test was done with Nurse Juju  - No response is seen.  No spontaneous body movements, Jerks seen.  Neck is supple.    LABS:    CBC Full  -  ( 30 Dec 2019 09:23 )  WBC Count : 14.74 K/uL  RBC Count : 3.89 M/uL  Hemoglobin : 12.2 g/dL  Hematocrit : 38.6 %  Platelet Count - Automated : 271 K/uL  Mean Cell Volume : 99.2 fl  Mean Cell Hemoglobin : 31.4 pg  Mean Cell Hemoglobin Concentration : 31.6 gm/dL    12-30    165<HH>  |  130<H>  |  12  ----------------------------<  149<H>  3.0<L>   |  28  |  1.15    Ca    9.2      30 Dec 2019 09:23  Phos  2.2     12-30  Mg     2.5     12-30    TPro  6.0  /  Alb  2.1<L>  /  TBili  0.2  /  DBili  x   /  AST  107<H>  /  ALT  109<H>  /  AlkPhos  133<H>  12-30    RADIOLOGY & ADDITIONAL STUDIES:    < from: CT Head No Cont (12.27.19 @ 15:28) >    IMPRESSION:    1)  diffuse anoxic changes to the brain with generalized cerebral edema and loss of gray-white differentiation. Follow-up MR imaging may be considered for further assessment, along with follow-up CT imaging.  2)  no obvious hemorrhage.    < end of copied text >    < from: CT Cervical Spine No Cont (12.27.19 @ 15:28) >    IMPRESSION:    No acute fracture identified. Multilevel degenerative changes.    < end of copied text >

## 2020-01-01 NOTE — PROGRESS NOTE ADULT - REASON FOR ADMISSION
cardiac arrest

## 2020-01-01 NOTE — PROGRESS NOTE ADULT - PROBLEM SELECTOR PLAN 1
card arrest  anoxic brain injury  poss ventilator liberation today  end of life care planned  pt is DNR

## 2020-01-01 NOTE — CHART NOTE - NSCHARTNOTEFT_GEN_A_CORE
Loree Leonela at bedside  requested removal of ventilator  he is aware of grave prognosis and that she likely meets brain death protocol.   signed molst form for removal of ventilator  family friend at bedside confirmed the plan.

## 2020-01-01 NOTE — CHART NOTE - NSCHARTNOTEFT_GEN_A_CORE
Assessment:     Pt is a 59 year old female with  past medical history of opioid abuse, depression and  ovarian ca  who was admitted  with cardiac arrest. Plan is for terminal extubation today as per MD notes.    Factors impacting intake: [ ] none [ ] nausea  [ ] vomiting [ ] diarrhea [ ] constipation  [ ]chewing problems [ ] swallowing issues  [ ] other:     Diet Presciption: Diet, NPO:   Except Medications (12-27-19 @ 16:37)    Intake: NPO    Current Weight: Weight (kg): 65 (12-27 @ 16:06)  % Weight Change  current 150#  generalized edema +1  admit 143#    Pertinent Medications: MEDICATIONS  (STANDING):  chlorhexidine 0.12% Liquid 15 milliLiter(s) Oral Mucosa every 12 hours  dextrose 5% + lactated ringers. 1000 milliLiter(s) (75 mL/Hr) IV Continuous <Continuous>  norepinephrine Infusion 0.03 MICROgram(s)/kG/Min (3.656 mL/Hr) IV Continuous <Continuous>  pantoprazole  Injectable 40 milliGRAM(s) IV Push daily  piperacillin/tazobactam IVPB.. 3.375 Gram(s) IV Intermittent every 8 hours  valproate sodium IVPB 500 milliGRAM(s) IV Intermittent two times a day    MEDICATIONS  (PRN):  fentaNYL    Injectable 25 MICROGram(s) IV Push every 30 minutes PRN pain, dyspnea, distress, suffering,    Pertinent Labs: 12-30 Na165 mmol/L<HH> Glu 149 mg/dL<H> K+ 3.0 mmol/L<L> Cr  1.15 mg/dL BUN 12 mg/dL 12-30 Phos 2.2 mg/dL<L> 12-30 Alb 2.1 g/dL<L> 12-28 [id number] 5.3 %     CAPILLARY BLOOD GLUCOSE        Skin: generalized edema +1    Estimated Needs:   [ x] no change since previous assessment  [ ] recalculated:     Previous Nutrition Diagnosis:   [ ] Inadequate Energy Intake [ ]Inadequate Oral Intake [ ] Excessive Energy Intake   [ ] Underweight [ ] Increased Nutrient Needs [ ] Overweight/Obesity   [ ] Altered GI Function [ ] Unintended Weight Loss [ ] Food & Nutrition Related Knowledge Deficit [ ] Malnutrition     Nutrition Diagnosis is [ ] ongoing  [ ] resolved [x ] not applicable     New Nutrition Diagnosis: [ ] not applicable       Interventions:   Recommend   plan is for terminal extubation: no intervention warranted  [ ] Change Diet To:  [ ] Nutrition Supplement  [ ] Nutrition Support  [ ] Other:     Monitoring and Evaluation:   [ ] PO intake [ x ] Tolerance to diet prescription [ x ] weights [ x ] labs[ x ] follow up per protocol  [ ] other:

## 2020-01-01 NOTE — PROGRESS NOTE ADULT - ASSESSMENT
The patient is a 59 year old female with unknown past medical history who presents with cardiac arrest.    Plan:  - Echocardiogram with grossly normal LV systolic function  - Urine tox noted - positive for methamphetamine, opiates, and methadone  - Troponin peaked at 28 and trending down. Cannot exclude type 1 NSTEMI, but more likely enzymes are elevated due to arrest and poor cardiac perfusion at that time.  - Off of heparin drip  - Recurrent PMVT. Not a candidate for defibrillation due to DNR.  - SVT - AF vs. AT. Continue to monitor.  - Remains on norepinephrine  - Mechanical ventilation  - Plan for palliative extubation today

## 2020-01-01 NOTE — PROGRESS NOTE ADULT - ASSESSMENT
58 y/o F w/ pmh of opioid abuse, depression, ovarian ca, presented to [facility] earlier today in cardiac arrest with ROSC. Pt now admitted to the [facility].    Cardiac Arrest   Etiology unknown but suspect Methamphetamine OD   Suspect brain death with fixed and dilated pupils; Neuro assessement reveals absent brain stem function  Terminal extubation today  fix levophed dose, limit unnecessary labs and sticks.   Having arrhythmias on monitor - likely related to cardiac ischemia from original event.   CC: Dr. Ramírez; Cardiology: Dr. Corley    Acute Respiratory Failure   Possible Aspiration; IV Zosyn;    Intubated   Follow up cultures     Transaminitis   Most likely from shock liver with possible underlying liver disease     Diet  NPO    DVT Prophylaxis  Heparin SC     Disposition  Terminal wean later today when family returns   There is major suspicion for brain herniation and brain death; prognosis is very grave

## 2020-01-01 NOTE — PROGRESS NOTE ADULT - ASSESSMENT
S/p cardiac arrest.  Seized in the beginning of the symptoms, likely sec to Anoxic brain injury.   All the modalities of Brain stem functions are absent.  Anoxic Encephalopathy.  CT Head - diffuse anoxic changes to the brain with generalized cerebral edema and loss of gray-white differentiation. No obvious hemorrhage.  Pt has NO CAPACITY sec to Anoxic Encephalopathy.  Spoke to  at bedside.  Grave Prognosis is explained again.  Exp[lained that chances of recovery are Zero   consented for Terminal weaning for now. Paperwork signed.  Emotional support provided.  DNR/DNI.  D/w Dr. Dan and covering nurse.

## 2020-01-01 NOTE — PROGRESS NOTE ADULT - SUBJECTIVE AND OBJECTIVE BOX
Subjective: No overnight events.  Family going to come back later for terminal wean    MEDICATIONS  (STANDING):  chlorhexidine 0.12% Liquid 15 milliLiter(s) Oral Mucosa every 12 hours  dextrose 5% + lactated ringers. 1000 milliLiter(s) (75 mL/Hr) IV Continuous <Continuous>  norepinephrine Infusion 0.03 MICROgram(s)/kG/Min (3.656 mL/Hr) IV Continuous <Continuous>  pantoprazole  Injectable 40 milliGRAM(s) IV Push daily  piperacillin/tazobactam IVPB.. 3.375 Gram(s) IV Intermittent every 8 hours  valproate sodium IVPB 500 milliGRAM(s) IV Intermittent two times a day    MEDICATIONS  (PRN):  fentaNYL    Injectable 25 MICROGram(s) IV Push every 30 minutes PRN pain, dyspnea, distress, suffering,      Allergies    No Known Allergies    Intolerances        Vital Signs Last 24 Hrs  T(C): 35.3 (01 Jan 2020 04:00), Max: 35.6 (31 Dec 2019 16:00)  T(F): 95.6 (01 Jan 2020 04:00), Max: 96 (31 Dec 2019 16:00)  HR: 56 (01 Jan 2020 09:00) (55 - 146)  BP: 86/62 (01 Jan 2020 09:00) (66/36 - 224/184)  BP(mean): 71 (01 Jan 2020 09:00) (46 - 196)  RR: 16 (01 Jan 2020 09:00) (16 - 16)  SpO2: 98% (01 Jan 2020 09:00) (93% - 100%)    PHYSICAL EXAM:  GENERAL: Intubated and sedated   HEAD:  Atraumatic, Normocephalic  ENMT: Moist mucous membranes,   NECK: Supple, No JVD, Normal thyroid  CHEST/LUNG: Coarse sounds bilaterally   HEART: Regular rate and rhythm; No murmurs, rubs, or gallops  ABDOMEN: Soft, Nontender, Nondistended; Bowel sounds present  EXTREMITIES:  2+ Peripheral Pulses, No clubbing, cyanosis, or edema      LABS:              CAPILLARY BLOOD GLUCOSE          RADIOLOGY & ADDITIONAL TESTS:    Imaging Personally Reviewed:  [ ] YES     Consultant(s) Notes Reviewed:      Care Discussed with Consultants/Other Providers:    Advanced Directives: [ ] DNR  [ ] No feeding tube  [ ] MOLST in chart  [ ] MOLST completed today  [ ] Unknown

## 2020-11-20 NOTE — ED ADULT TRIAGE NOTE - NS ED TRIAGE HISTORIAN
From: Yoko Jones  To: Evy Mckeon NP  Sent: 11/19/2020 5:36 PM CST  Subject: Upcoming Appointment    I was wondering were there a sooner date I can get in earlier   EMS

## 2022-07-28 NOTE — PROCEDURE NOTE - NSINFORMCONSENT_GEN_A_CORE
Courtesy Call: Left Message  
This was an emergent procedure.
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
## 2025-04-24 NOTE — ED PROVIDER NOTE - NORMAL, MLM
Received call from [facility] to confirm brand Vyvanse is being requested, advised yes as patient is Medicaid so brand is preferred. Nothing further needed at this time.    marcelino all pertinent systems normal